# Patient Record
Sex: FEMALE | URBAN - METROPOLITAN AREA
[De-identification: names, ages, dates, MRNs, and addresses within clinical notes are randomized per-mention and may not be internally consistent; named-entity substitution may affect disease eponyms.]

---

## 2023-02-13 ENCOUNTER — OFFICE VISIT (OUTPATIENT)
Dept: NEUROSURGERY | Age: 73
End: 2023-02-13
Payer: MEDICARE

## 2023-02-13 VITALS
HEIGHT: 62 IN | DIASTOLIC BLOOD PRESSURE: 90 MMHG | TEMPERATURE: 97.1 F | HEART RATE: 79 BPM | SYSTOLIC BLOOD PRESSURE: 132 MMHG | BODY MASS INDEX: 29.74 KG/M2 | OXYGEN SATURATION: 98 % | WEIGHT: 161.6 LBS

## 2023-02-13 DIAGNOSIS — I67.1 BRAIN ANEURYSM: Primary | ICD-10-CM

## 2023-02-13 PROCEDURE — 99204 OFFICE O/P NEW MOD 45 MIN: CPT | Performed by: RADIOLOGY

## 2023-02-13 PROCEDURE — 1123F ACP DISCUSS/DSCN MKR DOCD: CPT | Performed by: RADIOLOGY

## 2023-02-13 PROCEDURE — G8417 CALC BMI ABV UP PARAM F/U: HCPCS | Performed by: RADIOLOGY

## 2023-02-13 PROCEDURE — G8432 DEP SCR NOT DOC, RNG: HCPCS | Performed by: RADIOLOGY

## 2023-02-13 PROCEDURE — G8536 NO DOC ELDER MAL SCRN: HCPCS | Performed by: RADIOLOGY

## 2023-02-13 PROCEDURE — 1090F PRES/ABSN URINE INCON ASSESS: CPT | Performed by: RADIOLOGY

## 2023-02-13 PROCEDURE — 1101F PT FALLS ASSESS-DOCD LE1/YR: CPT | Performed by: RADIOLOGY

## 2023-02-13 PROCEDURE — 3017F COLORECTAL CA SCREEN DOC REV: CPT | Performed by: RADIOLOGY

## 2023-02-13 PROCEDURE — G8427 DOCREV CUR MEDS BY ELIG CLIN: HCPCS | Performed by: RADIOLOGY

## 2023-02-13 PROCEDURE — G8400 PT W/DXA NO RESULTS DOC: HCPCS | Performed by: RADIOLOGY

## 2023-02-13 RX ORDER — ROSUVASTATIN CALCIUM 20 MG/1
20 TABLET, COATED ORAL
COMMUNITY

## 2023-02-13 RX ORDER — CETIRIZINE HYDROCHLORIDE 10 MG/1
10 TABLET ORAL DAILY
COMMUNITY

## 2023-02-13 RX ORDER — MULTIVITAMIN
1 TABLET ORAL DAILY
COMMUNITY

## 2023-02-13 NOTE — PROGRESS NOTES
New patient referred by Dr Smita Alvarez presenting with Cerebral aneurysm. Spouse at visit with patient. Patient reports cerebral aneurysm since 2012 when diagnosed with Bell's palsy.   Denies headaches

## 2023-02-17 DIAGNOSIS — I67.1 CEREBRAL ANEURYSM: Primary | ICD-10-CM

## 2023-02-21 NOTE — PROGRESS NOTES
Neurointerventional Surgery Clinic Note        Patient: Chelsey Quintero MRN: [id number]  SSN: [id number]    YOB: 1950  Age: 67 y.o. Sex: female      Subjective:      Chelsey Quintero is a 67 y.o. female who is being seen for a right posterior communicating artery aneurysm, diagnosed previously at an outside institution. Patient presents to the clinic for consultation regarding further management. Past Medical History:   Diagnosis Date    Bell's palsy     History of cataract surgery     Osteopenia 2020     Past Surgical History:   Procedure Laterality Date    HX  SECTION      , 0      Family History   Problem Relation Age of Onset    Stroke Father     Cancer Father     Cancer Brother     Heart Disease Other      Social History     Tobacco Use    Smoking status: Unknown    Smokeless tobacco: Not on file   Substance Use Topics    Alcohol use: Not on file      Current Outpatient Medications   Medication Sig Dispense Refill    omega-3s/dha/epa/fish oil (OMEGA 3 PO) Take 1,200 mg by mouth daily. multivitamin (ONE A DAY) tablet Take 1 Tablet by mouth daily. cholecalciferol, vitamin D3, (VITAMIN D3 PO) Take 2,000 Int'l Units/day by mouth daily. calcium carbonate (CALCIUM 600 PO) Take 600 mg by mouth daily. cetirizine (Aller-Natasha) 10 mg tablet Take 10 mg by mouth daily. rosuvastatin (CRESTOR) 20 mg tablet Take 20 mg by mouth nightly. Not on File    Review of Systems:  Pertinent items are noted in the History of Present Illness.     Objective:     Vitals:    23 1059   BP: (!) 132/90   Pulse: 79   Temp: 97.1 °F (36.2 °C)   TempSrc: Temporal   SpO2: 98%   Weight: 161 lb 9.6 oz (73.3 kg)   Height: 5' 2\" (1.575 m)        Physical Exam:  GENERAL: alert, cooperative, no distress, appears stated age  LUNG: clear to auscultation bilaterally  HEART: regular rate and rhythm  EXTREMITIES:  extremities normal, atraumatic, no cyanosis or edema    Neurologic Exam:  Mental Status:  Alert and oriented x 4. Appropriate affect, mood and behavior. Language:    Normal fluency, repetition, comprehension and naming. Cranial Nerves:   EOMI, PERRLA      Facial sensation intact V1 - V3. Full facial strength, no asymmetry. Hearing intact bilaterally. No dysarthria. Tongue protrudes to midline  symmetrically. Shoulder shrug 5/5 bilaterally. Motor:    No pronator drift. Bulk and tone normal.      5/5 power in all extremities proximally and distally. No involuntary movements. Sensation:    Sensation intact throughout to light touch    Reflexes:    Deferred    Coordination & Gait: Normal      My interpretation of Imaging:       I have reviewed patient's prior CTAs including the most recent CTA from 01/2023. The CT angiograms demonstrate a broad-based saccular aneurysm at the right posterior communicating artery origin, measuring approximately 6-7 mm. When compared to prior CTAs, this aneurysm does not appear to have significantly changed in size and configuration in the interim. No additional aneurysm, AV malformation or hemodynamically significant stenosis noted. Assessment:     I recommend a catheter cerebral arteriogram for further assessment of the aneurysm to better assess the size and configuration of the aneurysm and its relationship with the posterior communicating artery. Further discussion regarding conservative follow-up versus aneurysm treatment will be held after the angiogram has been performed. In addition, if conservative management is chosen by the patient, the study will establish a good baseline for future comparison. All of patient's and her 's questions answered to their satisfaction. They understand and agree to the plan.       Plan:     Catheter cerebral arteriogram.    In accordance with the patient's symptoms and imaging findings, i recommended that we perform a diagnostic cervico-cerebral angiogram to further evaluate and characterize for intracranial and extracranial vascular pathology. I have discussed the risks, benefits and alternatives of the procedure with the patient in detail. The risks discussed included but were not limited to bleeding, infection, blood vessel damage/dissection, stroke with transient or permanent weakness, numbness or other deficit, speech and language dysfunction, vision loss, brain damage and death. Potential need for more surgery or interventions was also discussed. Risk of renal failure from contrast was also discussed. Patient understood, agreed and signed the consent form. The patient questions were answered thoroughly. The patient expressed understanding and elected to proceed with endovascular diagnostic cervico-cerebral angiogram. The patient consented for the procedure. Thank you for allowing me to participate in the care of this patient. Greater than 45 minutes were spent in patient management, greater than half of which was spent in counseling and coordination of care.         Signed By: Poonam Zavaleta MD, MBA  Neuro-endovascular Surgery  Director - [facility] Stroke [facility], [facility]  Associate Professor of Radiology, [facility]      February 21, 2023

## 2023-02-23 ENCOUNTER — TELEPHONE (OUTPATIENT)
Dept: NEUROSURGERY | Age: 73
End: 2023-02-23

## 2023-02-23 NOTE — TELEPHONE ENCOUNTER
Patient called to let Pablo Karimi and Dr. Mayra Meade know that she would like to proceed with Dr. Tanner Body recommendations.

## 2023-03-02 ENCOUNTER — TELEPHONE (OUTPATIENT)
Dept: NEUROSURGERY | Age: 73
End: 2023-03-02

## 2023-03-02 DIAGNOSIS — I67.1 BRAIN ANEURYSM: Primary | ICD-10-CM

## 2023-03-02 NOTE — TELEPHONE ENCOUNTER
Spoke to patient to confirm Diagnostic Angiogram on 3/23/2023 at [address] time 7:00 am at Out Patient Registration. Blood work will be needed at least one week prior to procedure at a [facility].  No eating or drinking after midnight the night prior to Angiogram and take all morning medications with sips of water the morning of the angiogram.  You must have a  to drive you home upon discharge. Recommend you have someone with you for at least 24-48 hours post procedure. No metal or glue in hair. Patient stated understanding.

## 2023-03-13 ENCOUNTER — TELEPHONE (OUTPATIENT)
Dept: NEUROSURGERY | Age: 73
End: 2023-03-13

## 2023-03-13 NOTE — TELEPHONE ENCOUNTER
Patient called to speak to Eric Guerrier because she has a few questions about her procedure on 3/23/23.

## 2023-03-14 NOTE — TELEPHONE ENCOUNTER
Spoke to patient to explain that she will only have a Diagnostic angiogram on 3/23/2023. The provider will not treat condition at that time. Informed patient if she needs treatment, it will be 3-4 weeks after Diagnostic angiogram.   Explained about insurance authorization and PAT. Patient stated understanding.

## 2023-03-22 ENCOUNTER — TELEPHONE (OUTPATIENT)
Dept: NEUROSURGERY | Age: 73
End: 2023-03-22

## 2023-03-22 NOTE — TELEPHONE ENCOUNTER
Spoke to patient to confirm Diagnostic Angiogram tomorrow at [address] time 7:00 am at Out Patient Registration. Reminded patient:  No eating or drinking after midnight the night prior to Angiogram and take all morning medications with sips of water the morning of the angiogram.  You must have a  to drive you home upon discharge. Recommend you have someone with you for at least 24-48 hours post procedure. No metal or glue in hair. All patients questions answered. Patient stated understanding.

## 2023-03-23 ENCOUNTER — HOSPITAL ENCOUNTER (OUTPATIENT)
Dept: INTERVENTIONAL RADIOLOGY/VASCULAR | Age: 73
Discharge: HOME | End: 2023-03-23
Attending: RADIOLOGY | Admitting: RADIOLOGY
Payer: MEDICARE

## 2023-03-23 VITALS
DIASTOLIC BLOOD PRESSURE: 65 MMHG | OXYGEN SATURATION: 97 % | SYSTOLIC BLOOD PRESSURE: 113 MMHG | HEART RATE: 54 BPM | WEIGHT: 163 LBS | BODY MASS INDEX: 30 KG/M2 | TEMPERATURE: 98.6 F | HEIGHT: 62 IN | RESPIRATION RATE: 18 BRPM

## 2023-03-23 DIAGNOSIS — I67.1 BRAIN ANEURYSM: ICD-10-CM

## 2023-03-23 PROCEDURE — 74011250636 HC RX REV CODE- 250/636: Performed by: RADIOLOGY

## 2023-03-23 PROCEDURE — 77030008814 HC VLV ACC PLUS MRTM -B

## 2023-03-23 PROCEDURE — 77030008584 HC TOOL GDWRE DEV TERU -A

## 2023-03-23 PROCEDURE — 74011250637 HC RX REV CODE- 250/637: Performed by: RADIOLOGY

## 2023-03-23 PROCEDURE — C1887 CATHETER, GUIDING: HCPCS

## 2023-03-23 PROCEDURE — 77030019569 HC BND COMPR RAD TERU -B

## 2023-03-23 PROCEDURE — 2709999900 HC NON-CHARGEABLE SUPPLY

## 2023-03-23 PROCEDURE — 74011000250 HC RX REV CODE- 250: Performed by: RADIOLOGY

## 2023-03-23 PROCEDURE — C1894 INTRO/SHEATH, NON-LASER: HCPCS

## 2023-03-23 PROCEDURE — C1769 GUIDE WIRE: HCPCS

## 2023-03-23 PROCEDURE — 36224 PLACE CATH CAROTD ART: CPT

## 2023-03-23 PROCEDURE — 74011000636 HC RX REV CODE- 636: Performed by: RADIOLOGY

## 2023-03-23 PROCEDURE — 77030003394 HC NDL ART COOK -A

## 2023-03-23 RX ORDER — HEPARIN SODIUM 1000 [USP'U]/ML
2000 INJECTION, SOLUTION INTRAVENOUS; SUBCUTANEOUS
Status: COMPLETED | OUTPATIENT
Start: 2023-03-23 | End: 2023-03-23

## 2023-03-23 RX ORDER — VERAPAMIL HYDROCHLORIDE 2.5 MG/ML
2.5 INJECTION, SOLUTION INTRAVENOUS
Status: COMPLETED | OUTPATIENT
Start: 2023-03-23 | End: 2023-03-23

## 2023-03-23 RX ORDER — FENTANYL CITRATE 50 UG/ML
25-100 INJECTION, SOLUTION INTRAMUSCULAR; INTRAVENOUS
Status: DISCONTINUED | OUTPATIENT
Start: 2023-03-23 | End: 2023-03-23

## 2023-03-23 RX ORDER — MIDAZOLAM HYDROCHLORIDE 1 MG/ML
.5-2 INJECTION, SOLUTION INTRAMUSCULAR; INTRAVENOUS
Status: DISCONTINUED | OUTPATIENT
Start: 2023-03-23 | End: 2023-03-23

## 2023-03-23 RX ORDER — SODIUM BICARBONATE 42 MG/ML
1 INJECTION, SOLUTION INTRAVENOUS
Status: COMPLETED | OUTPATIENT
Start: 2023-03-23 | End: 2023-03-23

## 2023-03-23 RX ORDER — LIDOCAINE HYDROCHLORIDE 10 MG/ML
10 INJECTION INFILTRATION; PERINEURAL
Status: COMPLETED | OUTPATIENT
Start: 2023-03-23 | End: 2023-03-23

## 2023-03-23 RX ORDER — LIDOCAINE 40 MG/G
CREAM TOPICAL
Status: COMPLETED | OUTPATIENT
Start: 2023-03-23 | End: 2023-03-23

## 2023-03-23 RX ORDER — SODIUM CHLORIDE 9 MG/ML
50 INJECTION, SOLUTION INTRAVENOUS CONTINUOUS
Status: DISCONTINUED | OUTPATIENT
Start: 2023-03-23 | End: 2023-03-23

## 2023-03-23 RX ADMIN — SODIUM BICARBONATE 42 MG: 42 INJECTION, SOLUTION INTRAVENOUS at 08:24

## 2023-03-23 RX ADMIN — Medication 4000 UNITS: at 08:22

## 2023-03-23 RX ADMIN — VERAPAMIL HYDROCHLORIDE 2.5 MG: 2.5 INJECTION INTRAVENOUS at 08:24

## 2023-03-23 RX ADMIN — Medication 4000 UNITS: at 08:21

## 2023-03-23 RX ADMIN — LIDOCAINE 4%: 4 CREAM TOPICAL at 07:51

## 2023-03-23 RX ADMIN — Medication 4000 UNITS: at 08:24

## 2023-03-23 RX ADMIN — NITROGLYCERIN 100 MCG: 10 INJECTION INTRAVENOUS at 08:24

## 2023-03-23 RX ADMIN — SODIUM CHLORIDE 50 ML/HR: 9 INJECTION, SOLUTION INTRAVENOUS at 08:07

## 2023-03-23 RX ADMIN — LIDOCAINE HYDROCHLORIDE 1 ML: 10 INJECTION, SOLUTION INFILTRATION; PERINEURAL at 08:19

## 2023-03-23 RX ADMIN — HEPARIN SODIUM 2000 UNITS: 1000 INJECTION INTRAVENOUS; SUBCUTANEOUS at 08:24

## 2023-03-23 RX ADMIN — Medication 4000 UNITS: at 08:19

## 2023-03-23 RX ADMIN — FENTANYL CITRATE 50 MCG: 50 INJECTION, SOLUTION INTRAMUSCULAR; INTRAVENOUS at 08:13

## 2023-03-23 RX ADMIN — NITROGLYCERIN 1 INCH: 20 OINTMENT TOPICAL at 07:51

## 2023-03-23 RX ADMIN — IOPAMIDOL 129 ML: 612 INJECTION, SOLUTION INTRAVENOUS at 09:10

## 2023-03-23 RX ADMIN — MIDAZOLAM 1 MG: 1 INJECTION INTRAMUSCULAR; INTRAVENOUS at 08:13

## 2023-03-23 NOTE — DISCHARGE INSTRUCTIONS
You have received sedation medications today. YOU SHOULD NOT DRIVE FOR 24 HOURS, DO NOT OPERATE HEAVY MACHINERY, DO NOT MAKE ANY LEGAL DECISIONS OR SIGN LEGAL DOCUMENTS FOR 24 HOURS. DO NOT DRINK ALCOHOL, TAKE ANY MEDICATIONS UNLESS PRESCRIBED BY YOUR DOCTOR. IF YOU ARE A CAREGIVER, SOMEONE SHOULD TAKE THAT ROLE FOR 24 HOURS. Side effects of sedation medications and other medications used today have been reviewed  Those side effects can include but are not limited to: dizziness, drowsiness, poor balance, fatigue, sleepiness. Take precautions at home to prevent falls, such as assistance with walking or stairs if allowed and /or when needed or position changes. Allergic or adverse reactions could include nausea, itching, hives, dizziness, or anything else out of the ordinary. Should you experience any of these significant changes, please call [phone] between the hours of 7:30 am and 3:30 pm or [phone] after hours. After hours, ask the  to page the X-ray Technologist, and describe the problem to the technologist. If you are experiencing chest pain, shortness of breath, altered mental state, unusual bleeding or any other emergent symptom you should call 911 immediately. Brain Angiogram: What to Expect at [address] brain angiogram (cerebral angiogram) is a test (also called a procedure) that looks for problems with blood vessels and blood flow in the brain. The doctor inserted a thin, flexible tube (catheter) into a blood vessel in your groin. Or the doctor may have put the catheter in a blood vessel in your arm. Then a dye was inserted into the catheter. The dye flowed into the blood vessel. The dye made the blood vessels show up on a video screen. You may have had this test to see if a blood vessel in the brain is bulging, narrowed, or blocked.  The test may also be used to check other symptoms, such as unusual headaches, or to check problems found during a different test.  You may have a bruise where the catheter was put in, and you may feel sore for a few days. You can do light activities around the house. But don't do anything strenuous for several days. This care sheet gives you a general idea about how long it will take for you to recover. But each person recovers at a different pace. Follow the steps below to feel better as quickly as possible. How can you care for yourself at home? Activity    Do not do strenuous exercise and do not lift, pull, or push anything heavy until your doctor says it is okay. This may be for several days. You can walk around the house and do light activity, such as cooking. If the catheter was placed in your groin, try not to walk up stairs for the first couple of days. If the catheter was placed in your arm near your wrist, do not bend your wrist deeply for the first couple of days. Be careful using your hand to get into and out of a chair or bed. If your doctor recommends it, get more exercise. Walking is a good choice. Bit by bit, increase the amount you walk every day. Try for at least 30 minutes on most days of the week. Diet    Drink plenty of fluids to help your body flush out the dye. If you have kidney, heart, or liver disease and have to limit fluids, talk with your doctor before you increase the amount of fluids you drink. Keep eating a heart-healthy diet that has lots of fruits, vegetables, and whole grains. If you need help with your diet, talk to your doctor. You also may want to talk to a dietitian. This expert can help you to learn about healthy foods and plan meals. Medicines    You can restart your medicines. If you stopped taking aspirin or some other blood thinner, your doctor will tell you when to start taking it again. Be safe with medicines. Read and follow all instructions on the label. If the doctor gave you a prescription medicine for pain, take it as prescribed.   If you are not taking a prescription pain medicine, ask your doctor if you can take an over-the-counter medicine. Care of the catheter site    For the first 3 days, keep a bandage over the spot where the catheter was put in. Put ice or a cold pack on the area for 10 to 20 minutes at a time. Try to do this every 1 to 2 hours for the next 3 days (when you are awake) or until the swelling goes down. Put a thin cloth between the ice and your skin. You may shower 24 hours after the procedure, if your doctor okays it. Pat the incision dry. Do not soak the catheter site until it is healed. Don't take a bath for 1 week, or until your doctor tells you it is okay. Watch for bleeding from the site. A small amount of blood (up to the size of a quarter) on the bandage can be normal.     If you are bleeding, lie down and press on the area for 15 minutes to try to make it stop. If the bleeding does not stop, call your doctor or seek immediate medical care. Follow-up care is a key part of your treatment and safety. Be sure to make and go to all appointments, and call your doctor if you are having problems. It's also a good idea to know your test results and keep a list of the medicines you take. When should you call for help? Call 911 anytime you think you may need emergency care. For example, call if:    You passed out (lost consciousness). You have severe trouble breathing. You have sudden chest pain and shortness of breath, or you cough up blood. You have symptoms of a stroke. These may include:  Sudden numbness, tingling, weakness, or loss of movement in your face, arm, or leg, especially on only one side of your body. Sudden vision changes. Sudden trouble speaking. Sudden confusion or trouble understanding simple statements. Sudden problems with walking or balance. A sudden, severe headache that is different from past headaches.    Call your doctor now or seek immediate medical care if:    You are bleeding from the area where the catheter was put in your artery. You have a fast-growing, painful lump at the catheter site. You have symptoms of infection, such as: Increased pain, swelling, warmth, or redness. Red streaks leading from the area. Pus draining from the area. A fever. Your leg, arm, or hand is painful, looks blue, or feels cold, numb, or tingly. Watch closely for any changes in your health, and be sure to contact your doctor if:    You are not getting better as expected. Where can you learn more? Go to http://www.gray.com/  Enter O249 in the search box to learn more about \"Brain Angiogram: What to Expect at Home. \"  Current as of: March 28, 2022               Content Version: 13.4  © 2006-2022 [employer]. Care instructions adapted under license by [employer] (which disclaims liability or warranty for this information). If you have questions about a medical condition or this instruction, always ask your healthcare professional. [place] [address] any warranty or liability for your use of this information.

## 2023-03-23 NOTE — PROGRESS NOTES
Name of procedure:  diagnostic cerebral angiogram     Sedation medications given: versed 1mg, fentanyl 50 mcg     Sedation tolerated: well     Total Procedure time: 50 min     Vital Signs: stable     Any complications related to procedure: see orders     Post Procedure Care Needed/order sets placed in connect care: see orders     Pt tolerated procedure well. VSS. No C/O pain. Dressing to site D&I. No bleeding or hematoma noted to site. IV D/Cd. Discharge instructions given. Copy on chart and copy given to pt. Pt verbalized understanding. Pt taken to car by wheelchair and taken home by family. NAD noted at time of discharge.

## 2023-03-23 NOTE — BRIEF OP NOTE
Neuro-Interventional Surgery - Brief procedure note      Patient: Nga Robb MRN: [id number]     YOB: 1950  Age: 67 y.o. Sex: female      Service: Neuro-Interventional Surgery    Date of Procedure: 3/23/2023    Pre-Procedure Diagnosis: Intracranial aneurysm    Post-Procedure Diagnosis: SAME    Procedure(s): Catheter cerebral/cervical arteriogram    Vessels selected: Right vertebral artery, Right common carotid artery, Right internal carotid artery, Left common carotid artery, and Left subclavian artery    Brief Description of Procedure: Broad based R Pcomm aneurysm identified. No additional aneurysm noted. Right common radial arterial puncture site hemostasis achieved by deploying TR band without complications. No hematoma or oozing noted. Sterile dressing applied over the puncture site. Anesthesia:Moderate Sedation    Estimated Blood Loss:  10 ml. Specimens:  None    Implants:  None    Apparent Intraoperative Complications:  None immediate    Patient Condition:  Stable    Disposition:  Same day recovery unit    Attestation:  I performed the procedure.         Signed By: Sandra Chaparro MD     March 23, 2023     [address] [facility]

## 2023-03-23 NOTE — ROUTINE PROCESS
Pt arrives ambulatory to angio department accompanied by family for cerebral angiogram procedure. All assessments completed and consent was reviewed. Education given was regarding procedure, moderate sedation, post-procedure care and  management/follow-up. Opportunity for questions was provided and all questions and concerns were addressed.

## 2023-03-23 NOTE — H&P
NeuroInterventional Surgery H&P  Jessi Blackman NP    Patient: Nga Robb MRN: [id number]  SSN: [id number]    YOB: 1950  Age: 67 y.o. Sex: female      Subjective:      Nga Robb is a 67 y.o. F with a pmh of bell's palsy and cataract repair who was referred to the [facility] by Dr Fermín Trevizo. She presents today for a diagnostic cerebral angiogram to evaluate a right posterior communicating artery aneurysm. She feels well overall today and is ready to proceed. Past Medical History:   Diagnosis Date    Bell's palsy     History of cataract surgery     Osteopenia 09/28/2020     Family History   Problem Relation Age of Onset    Stroke Father     Cancer Father     Cancer Brother     Heart Disease Other      Social History     Tobacco Use    Smoking status: Unknown    Smokeless tobacco: Not on file   Substance Use Topics    Alcohol use: Not on file      Prior to Admission Medications   Prescriptions Last Dose Informant Patient Reported? Taking?   calcium carbonate (CALCIUM 600 PO) 3/23/2023  Yes Yes   Sig: Take 600 mg by mouth daily. cetirizine (ZYRTEC) 10 mg tablet 3/23/2023  Yes Yes   Sig: Take 10 mg by mouth daily. cholecalciferol, vitamin D3, (VITAMIN D3 PO) 3/23/2023  Yes Yes   Sig: Take 2,000 Int'l Units/day by mouth daily. multivitamin (ONE A DAY) tablet 3/22/2023  Yes Yes   Sig: Take 1 Tablet by mouth daily. omega-3s/dha/epa/fish oil (OMEGA 3 PO) 3/23/2023  Yes Yes   Sig: Take 1,200 mg by mouth daily. rosuvastatin (CRESTOR) 20 mg tablet 3/23/2023  Yes Yes   Sig: Take 20 mg by mouth nightly. Facility-Administered Medications: None       No Known Allergies    Review of Systems:  A comprehensive review of systems was negative. Denies numbness, tingling, nausea, vomiting, difficulty swallowing or speaking, headache, blurred vision or dizziness.      Objective:     Vitals:    03/23/23 0716   BP: 124/77   Pulse: (!) 59   Resp: 13   Temp: 98.6 °F (37 °C)   SpO2: 96%   Weight: 163 lb (73.9 kg)   Height: 5' 2\" (1.575 m)      Physical Exam:  GENERAL: Calm, cooperative, NAD  SKIN: Warm, dry, color appropriate for ethnicity. Neurologic Exam:  Mental Status:  Alert and oriented x 4. Appropriate affect, mood and behavior. Language:    Normal fluency, repetition, comprehension and naming. Cranial Nerves:   Pupils 3 mm, equal, round and reactive to light. Visual fields full to confrontation. Extraocular movements intact. Facial sensation intact. Full facial strength, no asymmetry. Hearing grossly intact bilaterally. No dysarthria. Tongue protrudes to midline, palate elevates symmetrically. Shoulder shrug 5/5 bilaterally. Motor:    No pronator drift. Bulk and tone normal.      5/5 power in all extremities proximally and distally. No involuntary movements. Sensation:    Sensation intact throughout to light touch. Coordination & Gait: Normal. FTN and HTS intact with no ataxia present. Labs:  Lab Results   Component Value Date/Time    WBC 4.2 03/10/2023 08:59 AM    HGB 13.0 03/10/2023 08:59 AM    HCT 39.3 03/10/2023 08:59 AM    PLATELET 298 15/10/9884 08:59 AM    MCV 94 03/10/2023 08:59 AM      Lab Results   Component Value Date/Time    Sodium 143 03/10/2023 08:59 AM    Potassium 4.8 03/10/2023 08:59 AM    Chloride 106 03/10/2023 08:59 AM    CO2 26 03/10/2023 08:59 AM    Glucose 101 (H) 03/10/2023 08:59 AM    BUN 14 03/10/2023 08:59 AM    Creatinine 0.84 03/10/2023 08:59 AM    BUN/Creatinine ratio 17 03/10/2023 08:59 AM    Calcium 9.6 03/10/2023 08:59 AM     Imaging:  CTA from 01/2023. The CT angiograms demonstrate a broad-based saccular aneurysm at the right posterior communicating artery origin, measuring approximately 6-7 mm. When compared to prior CTAs, this aneurysm does not appear to have significantly changed in size and configuration in the interim.     Assessment:   Cerebral aneurysm    Plan:      Proceed with diagnostic cerebral angiogram today with Dr. Bryce Fink, benefits and alternatives of procedure were reviewed prior to this procedure by myself and Dr. Jacy Fay with patient. The patient verbalized understanding and would like to proceed with procedure as planned. All questions were answered, and written informed consent has been obtained.     Signed By: Go Capellan NP     March 23, 2023

## 2023-03-27 ENCOUNTER — OFFICE VISIT (OUTPATIENT)
Dept: NEUROSURGERY | Age: 73
End: 2023-03-27
Payer: MEDICARE

## 2023-03-27 VITALS
HEIGHT: 62 IN | WEIGHT: 161.2 LBS | HEART RATE: 76 BPM | OXYGEN SATURATION: 97 % | TEMPERATURE: 96.9 F | SYSTOLIC BLOOD PRESSURE: 120 MMHG | BODY MASS INDEX: 29.66 KG/M2 | DIASTOLIC BLOOD PRESSURE: 80 MMHG

## 2023-03-27 DIAGNOSIS — I67.1 BRAIN ANEURYSM: Primary | ICD-10-CM

## 2023-03-27 PROCEDURE — G8432 DEP SCR NOT DOC, RNG: HCPCS | Performed by: RADIOLOGY

## 2023-03-27 PROCEDURE — 1123F ACP DISCUSS/DSCN MKR DOCD: CPT | Performed by: RADIOLOGY

## 2023-03-27 PROCEDURE — G8417 CALC BMI ABV UP PARAM F/U: HCPCS | Performed by: RADIOLOGY

## 2023-03-27 PROCEDURE — 99213 OFFICE O/P EST LOW 20 MIN: CPT | Performed by: RADIOLOGY

## 2023-03-27 PROCEDURE — G8536 NO DOC ELDER MAL SCRN: HCPCS | Performed by: RADIOLOGY

## 2023-03-27 PROCEDURE — 3017F COLORECTAL CA SCREEN DOC REV: CPT | Performed by: RADIOLOGY

## 2023-03-27 PROCEDURE — G8427 DOCREV CUR MEDS BY ELIG CLIN: HCPCS | Performed by: RADIOLOGY

## 2023-03-27 PROCEDURE — 1101F PT FALLS ASSESS-DOCD LE1/YR: CPT | Performed by: RADIOLOGY

## 2023-03-27 PROCEDURE — G8400 PT W/DXA NO RESULTS DOC: HCPCS | Performed by: RADIOLOGY

## 2023-03-27 PROCEDURE — 1090F PRES/ABSN URINE INCON ASSESS: CPT | Performed by: RADIOLOGY

## 2023-03-28 ENCOUNTER — TELEPHONE (OUTPATIENT)
Dept: NEUROSURGERY | Age: 73
End: 2023-03-28

## 2023-03-28 NOTE — TELEPHONE ENCOUNTER
Patient called to let us know that she has decided to move forward with Dr. Torrie Curling proposed treatment. Please call her to answer a few questions and discuss dates.

## 2023-03-30 NOTE — TELEPHONE ENCOUNTER
Spoke to patient to tentatively schedule her procedure for 5/16/2023 at [facility]. Informed patient   She will be contacted by [facility] to schedule an appointment for labs, chest xray, ekg. No eating or drinking after midnight the day prior to procedure. Take morning medications the morning of procedure with sips of water. Do not stop taking Blood Thinners if applicable unless notified by this office. You must have a  to drive you home upon discharge. Recommend you have someone with you for at least 24-48 hours post procedure. No metal of glue in hair. Will start Plavix and Aspirin on 4/25/2023. Patient stated understanding.

## 2023-04-22 DIAGNOSIS — I67.1 BRAIN ANEURYSM: Primary | ICD-10-CM

## 2023-04-22 DIAGNOSIS — I67.1 CEREBRAL ANEURYSM: Primary | ICD-10-CM

## 2023-05-04 ENCOUNTER — TELEPHONE (OUTPATIENT)
Dept: NEUROSURGERY | Age: 73
End: 2023-05-04

## 2023-05-04 DIAGNOSIS — I67.1 BRAIN ANEURYSM: Primary | ICD-10-CM

## 2023-05-04 RX ORDER — CLOPIDOGREL BISULFATE 75 MG/1
75 TABLET ORAL DAILY
Qty: 30 TABLET | Refills: 5 | Status: SHIPPED | OUTPATIENT
Start: 2023-05-04

## 2023-05-04 RX ORDER — ASPIRIN 81 MG/1
81 TABLET ORAL DAILY
Qty: 30 TABLET | Refills: 5 | Status: SHIPPED | OUTPATIENT
Start: 2023-05-04

## 2023-05-09 DIAGNOSIS — I67.1 BRAIN ANEURYSM: Primary | ICD-10-CM

## 2023-05-10 ENCOUNTER — HOSPITAL ENCOUNTER (OUTPATIENT)
Facility: HOSPITAL | Age: 73
Discharge: HOME | End: 2023-05-13
Payer: MEDICAID

## 2023-05-10 VITALS
BODY MASS INDEX: 29.59 KG/M2 | HEIGHT: 62 IN | WEIGHT: 160.8 LBS | HEART RATE: 60 BPM | SYSTOLIC BLOOD PRESSURE: 119 MMHG | TEMPERATURE: 97.8 F | DIASTOLIC BLOOD PRESSURE: 79 MMHG

## 2023-05-10 LAB
ALBUMIN SERPL-MCNC: 3.6 G/DL (ref 3.5–5)
ALBUMIN/GLOB SERPL: 1.1 (ref 1.1–2.2)
ALP SERPL-CCNC: 68 U/L (ref 45–117)
ALT SERPL-CCNC: 64 U/L (ref 12–78)
ANION GAP SERPL CALC-SCNC: 2 MMOL/L (ref 5–15)
ASPIRIN: 389 ARU
AST SERPL-CCNC: 28 U/L (ref 15–37)
BASOPHILS # BLD: 0 K/UL (ref 0–0.1)
BASOPHILS NFR BLD: 0 % (ref 0–1)
BILIRUB SERPL-MCNC: 0.8 MG/DL (ref 0.2–1)
BUN SERPL-MCNC: 15 MG/DL (ref 6–20)
BUN/CREAT SERPL: 18 (ref 12–20)
CALCIUM SERPL-MCNC: 9.8 MG/DL (ref 8.5–10.1)
CHLORIDE SERPL-SCNC: 110 MMOL/L (ref 97–108)
CO2 SERPL-SCNC: 29 MMOL/L (ref 21–32)
CREAT SERPL-MCNC: 0.83 MG/DL (ref 0.55–1.02)
DIFFERENTIAL METHOD BLD: ABNORMAL
EOSINOPHIL # BLD: 0.3 K/UL (ref 0–0.4)
EOSINOPHIL NFR BLD: 10 % (ref 0–7)
ERYTHROCYTE [DISTWIDTH] IN BLOOD BY AUTOMATED COUNT: 12.7 % (ref 11.5–14.5)
GLOBULIN SER CALC-MCNC: 3.2 G/DL (ref 2–4)
GLUCOSE SERPL-MCNC: 104 MG/DL (ref 65–100)
HCT VFR BLD AUTO: 40.7 % (ref 35–47)
HGB BLD-MCNC: 12.7 G/DL (ref 11.5–16)
IMM GRANULOCYTES # BLD AUTO: 0 K/UL (ref 0–0.04)
IMM GRANULOCYTES NFR BLD AUTO: 0 % (ref 0–0.5)
LYMPHOCYTES # BLD: 1.1 K/UL (ref 0.8–3.5)
LYMPHOCYTES NFR BLD: 35 % (ref 12–49)
MCH RBC QN AUTO: 30.2 PG (ref 26–34)
MCHC RBC AUTO-ENTMCNC: 31.2 G/DL (ref 30–36.5)
MCV RBC AUTO: 96.9 FL (ref 80–99)
MONOCYTES # BLD: 0.4 K/UL (ref 0–1)
MONOCYTES NFR BLD: 12 % (ref 5–13)
NEUTS SEG # BLD: 1.4 K/UL (ref 1.8–8)
NEUTS SEG NFR BLD: 43 % (ref 32–75)
NRBC # BLD: 0 K/UL (ref 0–0.01)
NRBC BLD-RTO: 0 PER 100 WBC
P2Y12 PLT RESPONSE: 190 PRU (ref 194–418)
PLATELET # BLD AUTO: 204 K/UL (ref 150–400)
PMV BLD AUTO: 9.6 FL (ref 8.9–12.9)
POTASSIUM SERPL-SCNC: 4 MMOL/L (ref 3.5–5.1)
PROT SERPL-MCNC: 6.8 G/DL (ref 6.4–8.2)
RBC # BLD AUTO: 4.2 M/UL (ref 3.8–5.2)
SODIUM SERPL-SCNC: 141 MMOL/L (ref 136–145)
WBC # BLD AUTO: 3.2 K/UL (ref 3.6–11)

## 2023-05-10 PROCEDURE — 85576 BLOOD PLATELET AGGREGATION: CPT

## 2023-05-10 PROCEDURE — 86900 BLOOD TYPING SEROLOGIC ABO: CPT

## 2023-05-10 PROCEDURE — 85025 COMPLETE CBC W/AUTO DIFF WBC: CPT

## 2023-05-10 PROCEDURE — 86901 BLOOD TYPING SEROLOGIC RH(D): CPT

## 2023-05-10 PROCEDURE — 86850 RBC ANTIBODY SCREEN: CPT

## 2023-05-10 PROCEDURE — 80053 COMPREHEN METABOLIC PANEL: CPT

## 2023-05-10 PROCEDURE — 36415 COLL VENOUS BLD VENIPUNCTURE: CPT

## 2023-05-10 PROCEDURE — 93005 ELECTROCARDIOGRAM TRACING: CPT | Performed by: NURSE PRACTITIONER

## 2023-05-10 RX ORDER — CALCIUM CARBONATE/VITAMIN D3 600 MG-10
1 TABLET ORAL DAILY
COMMUNITY

## 2023-05-10 RX ORDER — ACETAMINOPHEN 160 MG
2 TABLET,DISINTEGRATING ORAL DAILY
COMMUNITY

## 2023-05-10 RX ORDER — CLOPIDOGREL BISULFATE 75 MG/1
75 TABLET ORAL DAILY
COMMUNITY

## 2023-05-10 RX ORDER — IBUPROFEN 200 MG
1 CAPSULE ORAL DAILY
COMMUNITY

## 2023-05-10 NOTE — PERIOP NOTE
CALLED TO CARDIOLOGY/DR. BRANES/[phone] AND REQ MOST RECENT NOTES AND ANY EKG IF AVAILABLE. NOTES FROM 06/2022 REC'D AND AN EKG FROM 2017. ALL PLACED ON CHART.
etc.) please notify your surgeon as soon as possible. It is important to be on time. If a situation occurs where you may be delayed, please call:  [phone] on the day of surgery. The [facility] staff can be reached at [phone]. Special instructions: [address] DAY OF SURGERY. Current Outpatient Medications   Medication Sig    clopidogrel (PLAVIX) 75 MG tablet Take 1 tablet by mouth daily    ASPIRIN 81 PO Take 81 mg by mouth daily    Cholecalciferol (VITAMIN D3) 50 MCG (2000 UT) CAPS Take 2 capsules by mouth daily    Omega 3 1200 MG CAPS Take 1 capsule by mouth daily    calcium 600 MG TABS tablet Take 1 tablet by mouth daily    Multiple Vitamins-Minerals (MULTIVITAMIN ADULTS PO) Take 1 tablet by mouth daily    cetirizine (ZYRTEC) 10 MG tablet Take 1 tablet by mouth daily    rosuvastatin (CRESTOR) 20 MG tablet Take 1 tablet by mouth daily     No current facility-administered medications for this encounter. YOU MUST ONLY TAKE THESE MEDICATIONS THE MORNING OF SURGERY WITH A SIP OF WATER: ZYRTEC, ROSUVASTATIN    MEDICATIONS TO TAKE THE MORNING OF SURGERY ONLY IF NEEDED: NONE    HOLD THESE PRESCRIPTION MEDICATIONS BEFORE SURGERY: NONE    Ask your surgeon/prescribing physician about when/if to STOP taking these medications: PER PT, SHE IS TO CONTINUE TAKING HER PLAVIX AND ASPIRIN PER DR. GARCIA    Stop all vitamins, herbal medicines and any non-steroidal anti-inflammatory drugs (i.e. Ibuprofen, Naproxen, Advil, Aleve) 7 days prior to surgery. You may take Tylenol. If you are currently taking Aspirin, Plavix, Brilinta, Coumadin or any other blood-thinning/anticoagulant medication contact your surgeon for instructions. Patient Information Regarding COVID Restrictions    Day of Procedure    Please park in the parking deck or any designated visitor parking lot.   Enter the facility through the Main Entrance of the hospital.  On the day of surgery, please

## 2023-05-11 LAB
ABO + RH BLD: NORMAL
BLOOD GROUP ANTIBODIES SERPL: NORMAL
EKG ATRIAL RATE: 53 BPM
EKG DIAGNOSIS: NORMAL
EKG P AXIS: 31 DEGREES
EKG P-R INTERVAL: 162 MS
EKG Q-T INTERVAL: 408 MS
EKG QRS DURATION: 70 MS
EKG QTC CALCULATION (BAZETT): 382 MS
EKG R AXIS: -11 DEGREES
EKG T AXIS: -6 DEGREES
EKG VENTRICULAR RATE: 53 BPM
SPECIMEN EXP DATE BLD: NORMAL

## 2023-05-11 PROCEDURE — 93010 ELECTROCARDIOGRAM REPORT: CPT | Performed by: INTERNAL MEDICINE

## 2023-05-15 ENCOUNTER — TELEPHONE (OUTPATIENT)
Age: 73
End: 2023-05-15

## 2023-05-15 NOTE — TELEPHONE ENCOUNTER
You will have Treatment tomorrow at [facility] 1540 time is 8:00 am at Patient Registration. Reminder to patient:  -No eating or drinking after midnight the day prior to procedure.  -Take morning medications the morning of procedure with sips of water.   -Do not stop taking Blood Thinners if applicable unless notified by this office.  -You must have a  to drive you home upon discharge. -Recommend you have someone with you for at least 24-48 hours post procedure.  -No metal or glue in hair. Confirmed NKA. Patient stated understanding.

## 2023-05-16 ENCOUNTER — ANESTHESIA (OUTPATIENT)
Facility: HOSPITAL | Age: 73
DRG: 027 | End: 2023-05-16
Payer: MEDICAID

## 2023-05-16 ENCOUNTER — HOSPITAL ENCOUNTER (INPATIENT)
Facility: HOSPITAL | Age: 73
LOS: 1 days | Discharge: HOME | DRG: 027 | End: 2023-05-17
Attending: RADIOLOGY | Admitting: RADIOLOGY
Payer: MEDICAID

## 2023-05-16 ENCOUNTER — ANESTHESIA EVENT (OUTPATIENT)
Facility: HOSPITAL | Age: 73
DRG: 027 | End: 2023-05-16
Payer: MEDICAID

## 2023-05-16 DIAGNOSIS — H93.A9 PULSATILE TINNITUS: ICD-10-CM

## 2023-05-16 DIAGNOSIS — I67.1 CEREBRAL ANEURYSM: Primary | ICD-10-CM

## 2023-05-16 LAB
ACT BLD: 137 SECS (ref 79–138)
ACT BLD: 233 SECS (ref 79–138)
ACT BLD: 239 SECS (ref 79–138)
ASPIRIN: 388 ARU
P2Y12 PLT RESPONSE: 172 PRU (ref 194–418)

## 2023-05-16 PROCEDURE — 2500000003 HC RX 250 WO HCPCS: Performed by: RADIOLOGY

## 2023-05-16 PROCEDURE — 36620 INSERTION CATHETER ARTERY: CPT | Performed by: ANESTHESIOLOGY

## 2023-05-16 PROCEDURE — 85347 COAGULATION TIME ACTIVATED: CPT

## 2023-05-16 PROCEDURE — 6370000000 HC RX 637 (ALT 250 FOR IP): Performed by: RADIOLOGY

## 2023-05-16 PROCEDURE — 85576 BLOOD PLATELET AGGREGATION: CPT

## 2023-05-16 PROCEDURE — 2580000003 HC RX 258

## 2023-05-16 PROCEDURE — C1769 GUIDE WIRE: HCPCS

## 2023-05-16 PROCEDURE — 3700000000 HC ANESTHESIA ATTENDED CARE: Performed by: RADIOLOGY

## 2023-05-16 PROCEDURE — 6370000000 HC RX 637 (ALT 250 FOR IP)

## 2023-05-16 PROCEDURE — 2000000000 HC ICU R&B

## 2023-05-16 PROCEDURE — 2580000003 HC RX 258: Performed by: NURSE ANESTHETIST, CERTIFIED REGISTERED

## 2023-05-16 PROCEDURE — 3700000001 HC ADD 15 MINUTES (ANESTHESIA): Performed by: RADIOLOGY

## 2023-05-16 PROCEDURE — 37799 UNLISTED PX VASCULAR SURGERY: CPT

## 2023-05-16 PROCEDURE — C1760 CLOSURE DEV, VASC: HCPCS | Performed by: RADIOLOGY

## 2023-05-16 PROCEDURE — 2500000003 HC RX 250 WO HCPCS

## 2023-05-16 PROCEDURE — 2500000003 HC RX 250 WO HCPCS: Performed by: NURSE ANESTHETIST, CERTIFIED REGISTERED

## 2023-05-16 PROCEDURE — 03VG3DZ RESTRICTION OF INTRACRANIAL ARTERY WITH INTRALUMINAL DEVICE, PERCUTANEOUS APPROACH: ICD-10-PCS | Performed by: RADIOLOGY

## 2023-05-16 PROCEDURE — 6360000002 HC RX W HCPCS: Performed by: RADIOLOGY

## 2023-05-16 PROCEDURE — 6360000004 HC RX CONTRAST MEDICATION: Performed by: RADIOLOGY

## 2023-05-16 PROCEDURE — 36415 COLL VENOUS BLD VENIPUNCTURE: CPT

## 2023-05-16 PROCEDURE — 2580000003 HC RX 258: Performed by: RADIOLOGY

## 2023-05-16 PROCEDURE — 6360000002 HC RX W HCPCS: Performed by: NURSE ANESTHETIST, CERTIFIED REGISTERED

## 2023-05-16 RX ORDER — CLOPIDOGREL BISULFATE 75 MG/1
75 TABLET ORAL ONCE
Status: COMPLETED | OUTPATIENT
Start: 2023-05-16 | End: 2023-05-16

## 2023-05-16 RX ORDER — HEPARIN SODIUM 1000 [USP'U]/ML
INJECTION, SOLUTION INTRAVENOUS; SUBCUTANEOUS PRN
Status: DISCONTINUED | OUTPATIENT
Start: 2023-05-16 | End: 2023-05-16 | Stop reason: SDUPTHER

## 2023-05-16 RX ORDER — PROPOFOL 10 MG/ML
INJECTION, EMULSION INTRAVENOUS PRN
Status: DISCONTINUED | OUTPATIENT
Start: 2023-05-16 | End: 2023-05-16 | Stop reason: SDUPTHER

## 2023-05-16 RX ORDER — LIDOCAINE HYDROCHLORIDE 20 MG/ML
INJECTION, SOLUTION EPIDURAL; INFILTRATION; INTRACAUDAL; PERINEURAL PRN
Status: DISCONTINUED | OUTPATIENT
Start: 2023-05-16 | End: 2023-05-16 | Stop reason: SDUPTHER

## 2023-05-16 RX ORDER — ONDANSETRON 2 MG/ML
INJECTION INTRAMUSCULAR; INTRAVENOUS PRN
Status: DISCONTINUED | OUTPATIENT
Start: 2023-05-16 | End: 2023-05-16 | Stop reason: SDUPTHER

## 2023-05-16 RX ORDER — DEXAMETHASONE SODIUM PHOSPHATE 4 MG/ML
INJECTION, SOLUTION INTRA-ARTICULAR; INTRALESIONAL; INTRAMUSCULAR; INTRAVENOUS; SOFT TISSUE PRN
Status: DISCONTINUED | OUTPATIENT
Start: 2023-05-16 | End: 2023-05-16 | Stop reason: SDUPTHER

## 2023-05-16 RX ORDER — LIDOCAINE HYDROCHLORIDE 10 MG/ML
INJECTION, SOLUTION EPIDURAL; INFILTRATION; INTRACAUDAL; PERINEURAL PRN
Status: COMPLETED | OUTPATIENT
Start: 2023-05-16 | End: 2023-05-16

## 2023-05-16 RX ORDER — ROCURONIUM BROMIDE 10 MG/ML
INJECTION, SOLUTION INTRAVENOUS PRN
Status: DISCONTINUED | OUTPATIENT
Start: 2023-05-16 | End: 2023-05-16 | Stop reason: SDUPTHER

## 2023-05-16 RX ORDER — SODIUM CHLORIDE 9 MG/ML
INJECTION, SOLUTION INTRAVENOUS CONTINUOUS
Status: DISCONTINUED | OUTPATIENT
Start: 2023-05-16 | End: 2023-05-17

## 2023-05-16 RX ORDER — NEOSTIGMINE METHYLSULFATE 1 MG/ML
INJECTION, SOLUTION INTRAVENOUS PRN
Status: DISCONTINUED | OUTPATIENT
Start: 2023-05-16 | End: 2023-05-16 | Stop reason: SDUPTHER

## 2023-05-16 RX ORDER — GLYCOPYRROLATE 0.2 MG/ML
INJECTION INTRAMUSCULAR; INTRAVENOUS PRN
Status: DISCONTINUED | OUTPATIENT
Start: 2023-05-16 | End: 2023-05-16 | Stop reason: SDUPTHER

## 2023-05-16 RX ORDER — ACETAMINOPHEN 325 MG/1
650 TABLET ORAL EVERY 4 HOURS PRN
Status: DISCONTINUED | OUTPATIENT
Start: 2023-05-16 | End: 2023-05-17 | Stop reason: HOSPADM

## 2023-05-16 RX ORDER — ASPIRIN 81 MG/1
81 TABLET, CHEWABLE ORAL DAILY
Status: DISCONTINUED | OUTPATIENT
Start: 2023-05-17 | End: 2023-05-17

## 2023-05-16 RX ORDER — LABETALOL HYDROCHLORIDE 5 MG/ML
10 INJECTION, SOLUTION INTRAVENOUS EVERY 4 HOURS PRN
Status: DISCONTINUED | OUTPATIENT
Start: 2023-05-16 | End: 2023-05-17 | Stop reason: HOSPADM

## 2023-05-16 RX ORDER — CETIRIZINE HYDROCHLORIDE 10 MG/1
10 TABLET ORAL DAILY
Status: DISCONTINUED | OUTPATIENT
Start: 2023-05-16 | End: 2023-05-17 | Stop reason: HOSPADM

## 2023-05-16 RX ORDER — ROSUVASTATIN CALCIUM 10 MG/1
20 TABLET, COATED ORAL DAILY
Status: DISCONTINUED | OUTPATIENT
Start: 2023-05-16 | End: 2023-05-17 | Stop reason: HOSPADM

## 2023-05-16 RX ORDER — CLOPIDOGREL BISULFATE 75 MG/1
75 TABLET ORAL DAILY
Status: DISCONTINUED | OUTPATIENT
Start: 2023-05-17 | End: 2023-05-17 | Stop reason: HOSPADM

## 2023-05-16 RX ORDER — ONDANSETRON 2 MG/ML
4 INJECTION INTRAMUSCULAR; INTRAVENOUS EVERY 8 HOURS PRN
Status: DISCONTINUED | OUTPATIENT
Start: 2023-05-16 | End: 2023-05-17 | Stop reason: HOSPADM

## 2023-05-16 RX ORDER — BUTALBITAL, ACETAMINOPHEN AND CAFFEINE 50; 325; 40 MG/1; MG/1; MG/1
1 TABLET ORAL EVERY 4 HOURS PRN
Status: DISCONTINUED | OUTPATIENT
Start: 2023-05-16 | End: 2023-05-17 | Stop reason: HOSPADM

## 2023-05-16 RX ORDER — 0.9 % SODIUM CHLORIDE 0.9 %
INTRAVENOUS SOLUTION INTRAVENOUS PRN
Status: DISCONTINUED | OUTPATIENT
Start: 2023-05-16 | End: 2023-05-16 | Stop reason: SDUPTHER

## 2023-05-16 RX ADMIN — SUGAMMADEX 100 MG: 100 INJECTION, SOLUTION INTRAVENOUS at 12:58

## 2023-05-16 RX ADMIN — GLYCOPYRROLATE 0.4 MG: 0.2 INJECTION INTRAMUSCULAR; INTRAVENOUS at 12:48

## 2023-05-16 RX ADMIN — HEPARIN SODIUM: 5000 INJECTION INTRAVENOUS; SUBCUTANEOUS at 12:06

## 2023-05-16 RX ADMIN — ACETAMINOPHEN 650 MG: 325 TABLET, FILM COATED ORAL at 22:10

## 2023-05-16 RX ADMIN — IOPAMIDOL 75 ML: 612 INJECTION, SOLUTION INTRAVENOUS at 12:40

## 2023-05-16 RX ADMIN — CLOPIDOGREL BISULFATE 75 MG: 75 TABLET ORAL at 09:50

## 2023-05-16 RX ADMIN — Medication 2 MG: at 12:48

## 2023-05-16 RX ADMIN — ROCURONIUM BROMIDE 50 MG: 10 SOLUTION INTRAVENOUS at 11:38

## 2023-05-16 RX ADMIN — PHENYLEPHRINE HYDROCHLORIDE 40 MCG/MIN: 10 INJECTION INTRAVENOUS at 11:46

## 2023-05-16 RX ADMIN — ACETAMINOPHEN 650 MG: 325 TABLET, FILM COATED ORAL at 16:27

## 2023-05-16 RX ADMIN — LIDOCAINE HYDROCHLORIDE 8 ML: 10 INJECTION, SOLUTION EPIDURAL; INFILTRATION; INTRACAUDAL; PERINEURAL at 12:02

## 2023-05-16 RX ADMIN — SODIUM CHLORIDE: 9 INJECTION, SOLUTION INTRAVENOUS at 22:08

## 2023-05-16 RX ADMIN — HEPARIN SODIUM: 5000 INJECTION INTRAVENOUS; SUBCUTANEOUS at 12:05

## 2023-05-16 RX ADMIN — LABETALOL HYDROCHLORIDE 10 MG: 5 INJECTION INTRAVENOUS at 15:11

## 2023-05-16 RX ADMIN — HEPARIN SODIUM 1000 UNITS: 1000 INJECTION, SOLUTION INTRAVENOUS; SUBCUTANEOUS at 12:20

## 2023-05-16 RX ADMIN — LIDOCAINE HYDROCHLORIDE 60 MG: 20 INJECTION, SOLUTION EPIDURAL; INFILTRATION; INTRACAUDAL; PERINEURAL at 11:38

## 2023-05-16 RX ADMIN — HEPARIN SODIUM: 5000 INJECTION INTRAVENOUS; SUBCUTANEOUS at 12:07

## 2023-05-16 RX ADMIN — PROPOFOL 200 MG: 10 INJECTION, EMULSION INTRAVENOUS at 11:38

## 2023-05-16 RX ADMIN — ONDANSETRON HYDROCHLORIDE 4 MG: 2 INJECTION, SOLUTION INTRAMUSCULAR; INTRAVENOUS at 12:52

## 2023-05-16 RX ADMIN — DEXAMETHASONE SODIUM PHOSPHATE 8 MG: 4 INJECTION, SOLUTION INTRAMUSCULAR; INTRAVENOUS at 11:52

## 2023-05-16 RX ADMIN — HEPARIN SODIUM: 5000 INJECTION INTRAVENOUS; SUBCUTANEOUS at 12:08

## 2023-05-16 RX ADMIN — SODIUM CHLORIDE 600 ML: 9 INJECTION, SOLUTION INTRAVENOUS at 13:06

## 2023-05-16 RX ADMIN — SODIUM CHLORIDE: 9 INJECTION, SOLUTION INTRAVENOUS at 13:55

## 2023-05-16 RX ADMIN — HEPARIN SODIUM 5000 UNITS: 1000 INJECTION, SOLUTION INTRAVENOUS; SUBCUTANEOUS at 12:07

## 2023-05-16 ASSESSMENT — PAIN SCALES - GENERAL
PAINLEVEL_OUTOF10: 3
PAINLEVEL_OUTOF10: 3
PAINLEVEL_OUTOF10: 0

## 2023-05-16 ASSESSMENT — PAIN DESCRIPTION - ORIENTATION: ORIENTATION: LEFT

## 2023-05-16 ASSESSMENT — PAIN DESCRIPTION - DESCRIPTORS: DESCRIPTORS: ACHING

## 2023-05-16 ASSESSMENT — PAIN DESCRIPTION - LOCATION: LOCATION: BACK

## 2023-05-16 ASSESSMENT — PAIN - FUNCTIONAL ASSESSMENT: PAIN_FUNCTIONAL_ASSESSMENT: NONE - DENIES PAIN

## 2023-05-16 NOTE — OR NURSING
Patient placed on angio table with no assistance. IR Staff and anesthesia present at bedside. Anesthesia to remain at bedside to manage pt airway, medications, VS and pt status.

## 2023-05-16 NOTE — ANESTHESIA PRE PROCEDURE
Department of Anesthesiology  Preprocedure Note       Name:  Karon Baltazar   Age:  67 y.o.  :  1950                                          MRN:  [id number]         Date:  2023      Surgeon: * Surgery not found *    Procedure:     Medications prior to admission:   Prior to Admission medications    Medication Sig Start Date End Date Taking? Authorizing Provider   clopidogrel (PLAVIX) 75 MG tablet Take 1 tablet by mouth daily    Historical Provider, MD   ASPIRIN 81 PO Take 81 mg by mouth daily    Historical Provider, MD   Cholecalciferol (VITAMIN D3) 50 MCG (2000) CAPS Take 2 capsules by mouth daily    Historical Provider, MD   Omega 3 1200 MG CAPS Take 1 capsule by mouth daily    Historical Provider, MD   calcium 600 MG TABS tablet Take 1 tablet by mouth daily    Historical Provider, MD   Multiple Vitamins-Minerals (MULTIVITAMIN ADULTS PO) Take 1 tablet by mouth daily    Historical Provider, MD   cetirizine (ZYRTEC) 10 MG tablet Take 1 tablet by mouth daily    Ar Automatic Reconciliation   rosuvastatin (CRESTOR) 20 MG tablet Take 1 tablet by mouth daily    Ar Automatic Reconciliation       Current medications:    No current facility-administered medications for this encounter. Allergies:  No Known Allergies    Problem List:  There is no problem list on file for this patient.       Past Medical History:        Diagnosis Date    Arthritis     Bell's palsy     Cataract     History of cataract surgery     Hyperlipidemia     Osteopenia 2020       Past Surgical History:        Procedure Laterality Date    BREAST SURGERY Left     BIOPSY, BENIGN     SECTION      ,     COLONOSCOPY      EYE SURGERY Bilateral     CATARACTS       Social History:    Social History     Tobacco Use    Smoking status: Never    Smokeless tobacco: Never   Substance Use Topics    Alcohol use: Not Currently                                Counseling given: Not Answered      Vital Signs (Current):

## 2023-05-16 NOTE — PROGRESS NOTES
Neuro Critical Care Brief Progress Note    Subjective:  Patient seen laying flat and resting comfortably. She is drowsy post sedation and denies any symptoms of headache, nausea, vomiting, numbness or tingling. She does say her right arm \"feels different\" where the arterial line is located. NEURO:   Mental status: Patient is alert and oriented x 4. Cranial Nerves: Speech and language intact. Attention and fund of knowledge is intact. EOMI, PERRL, no nystagmus, no ptosis. No dysarthria or aphasia. Full facial strength, no  asymmetry. Hearing intact bilaterally. Shrug Shoulders 5/5 B/L and equally. Motor:  Normal bulk and tone, 5/5 strength in BL UE and LE;  No involuntary movements. Skin:  Right groin site is C/D/I, no hematoma, no bleeding and no drainage. Pulses are easily palpated at the right groin and pedal sites.   Reflexes:  Deferred   Sensation: Intact x 4 extremities to light touch  Gait:  Deferred       Signed By: Milo Fothergill, APRN - NP, Neurocritical Care Nurse Practitioner    May 16, 2023

## 2023-05-16 NOTE — ANESTHESIA PROCEDURE NOTES
Arterial Line:    An arterial line was placed using surface landmarks, in the procedure area for the following indication(s): continuous blood pressure monitoring and blood sampling needed. A 20 gauge (size) (length), Arrow (type) catheter was placed, Seldinger technique used, into the right radial artery, secured by Tegaderm. Anesthesia type: Local  Local infiltration: Injection    Events:  patient tolerated procedure well with no complications. 5/16/2023 10:10 AM5/16/2023 10:20 AM  Anesthesiologist: Kevin Smith MD  Performed: Anesthesiologist   Preanesthetic Checklist  Completed: patient identified, IV checked, site marked, risks and benefits discussed, surgical/procedural consents, equipment checked, pre-op evaluation, timeout performed, anesthesia consent given, oxygen available, monitors applied/VS acknowledged and fire risk safety assessment completed and verbalized

## 2023-05-16 NOTE — PROGRESS NOTES
TRANSFER - OUT REPORT:    Verbal report given to [facility] RN on [employer]  being transferred to ICU rm 20 for routine progression of patient care       Report consisted of patient's Situation, Background, Assessment and   Recommendations(SBAR). Information from the following report(s) Nurse Handoff Report, Surgery Report, Intake/Output, MAR, and Cardiac Rhythm NSR  was reviewed with the receiving nurse. Grayling Assessment: No data recorded  Lines:   Peripheral IV 05/16/23 Right Antecubital (Active)       Peripheral IV 05/16/23 Left Antecubital (Active)       Arterial Line 05/16/23 Right Radial (Active)        Opportunity for questions and clarification was provided.       Patient transported with:  Monitor, O2 @ 3lpm, and Registered Nurse  CRNA

## 2023-05-16 NOTE — BRIEF OP NOTE
Neuro-Interventional Surgery - Brief procedure note      Patient: Belinda Russo MRN: [id number]     YOB: 1950  Age: 67 y.o. Sex: female      Service: Neuro-Interventional Surgery    Date of Procedure: 5/16/2023    Pre-Procedure Diagnosis: Right PComm aneurysm    Post-Procedure Diagnosis: SAME    Procedure(s):  Right Pcomm aneurysm embolization    Vessels selected:  Right CCA, Right ICA, Right MCA    Brief Description of Procedure: Successful embolization of Right PComm aneurysm done using a 4 mm x 14 mm pipeline flow diversion stent. Right common femoral arterial puncture site hemostasis achieved by deploying 8 F Angio-Seal closure device without complications. No hematoma or oozing noted. Distal R lower extremity pulses remain unchanged post hemostasis. Sterile dressing applied over the puncture site. Anesthesia:General    Estimated Blood Loss:  20 ml. Specimens:  None    Implants:   4 mm  x 14 mm Pipeline Flex flow diversion stent    Apparent Intraoperative Complications:   None    Patient Condition:   Stable    Disposition:   ICU    Attestation:  I performed the procedure.         Signed By: Donnie Estrada MD     May 16, 2023     [facility] NEUROINTERVENTIONAL SURGERY

## 2023-05-16 NOTE — ANESTHESIA POSTPROCEDURE EVALUATION
Department of Anesthesiology  Postprocedure Note    Patient: Belinda Holt  MRN: [id number]  YOB: 1950  Date of evaluation: 5/16/2023      Procedure Summary     Date: 05/16/23 Room / Location: [facility] ANGIO IR    Anesthesia Start: 1127 Anesthesia Stop: 1039    Procedure: IR OCCLUSIVE OR EMBOL PERC CENTL NERV SYS Diagnosis:       Pulsatile tinnitus      Pulsatile tinnitus      Cerebral aneurysm      (aneurysm)    Scheduled Providers: Alison Manriquez MD Responsible Provider: Alison Manriquez MD    Anesthesia Type: General ASA Status: 3          Anesthesia Type: General    Cameron Phase I: Cameron Score: 10    Cameron Phase II:        Anesthesia Post Evaluation    Patient location during evaluation: PACU  Patient participation: complete - patient participated  Level of consciousness: awake  Pain score: 2  Airway patency: patent  Nausea & Vomiting: no nausea  Complications: no  Cardiovascular status: blood pressure returned to baseline  Respiratory status: acceptable  Hydration status: euvolemic

## 2023-05-16 NOTE — H&P
and naming. Cranial Nerves:   Pupils 3 mm, equal, round and reactive to light. Visual fields full to confrontation. Extraocular movements intact. Facial sensation intact. Full facial strength, no asymmetry. Hearing grossly intact bilaterally. No dysarthria. Tongue protrudes to midline, palate elevates symmetrically. Shoulder shrug 5/5 bilaterally. Motor:    No pronator drift. Bulk and tone normal.      5/5 power in all extremities proximally and distally. No involuntary movements. Sensation:    Sensation intact throughout to light touch. Coordination & Gait: Normal. FTN and HTS intact with no ataxia present. Labs:  Lab Results   Component Value Date/Time    WBC 3.2 05/10/2023 09:06 AM    HGB 12.7 05/10/2023 09:06 AM    HCT 40.7 05/10/2023 09:06 AM     05/10/2023 09:06 AM    MCV 96.9 05/10/2023 09:06 AM      Lab Results   Component Value Date/Time     05/10/2023 09:06 AM    K 4.0 05/10/2023 09:06 AM     05/10/2023 09:06 AM    CO2 29 05/10/2023 09:06 AM    BUN 15 05/10/2023 09:06 AM       Imaging:  Cerebral Angiogram (03/23/23): A broad-based, slightly lobulated aneurysm at the origin of the fetal type right posterior communicating artery, measuring approximately 5 mm x 4 mm in maximal diameter.       Assessment:     Pipeline embolization of Right Posterior Communicating Artery Aneurysm, measuring approximately 5 mm x 4 mm    Plan:       - Proceed with cerebral angiogram with right PCA aneurysm pipeline embolization today with Dr. Milton Espinoza to ICU post procedure              - Check P2Y12 and aspirin assays post to procedure              - q1 hour neuro checks               - SBP goal , cardene/labetalol PRN              - Cont DAPT with  mg and Plavix 75 mg daily    Diet: Regular  Activity: Up with assist after bedrest is completed  DVT prophylaxis: SCDs  Isolation precautions: None  Anticipated disposition:

## 2023-05-17 ENCOUNTER — APPOINTMENT (OUTPATIENT)
Facility: HOSPITAL | Age: 73
DRG: 027 | End: 2023-05-17
Attending: RADIOLOGY
Payer: MEDICAID

## 2023-05-17 VITALS
RESPIRATION RATE: 19 BRPM | HEIGHT: 62 IN | SYSTOLIC BLOOD PRESSURE: 122 MMHG | HEART RATE: 69 BPM | OXYGEN SATURATION: 99 % | WEIGHT: 163 LBS | DIASTOLIC BLOOD PRESSURE: 72 MMHG | BODY MASS INDEX: 30 KG/M2 | TEMPERATURE: 98.2 F

## 2023-05-17 LAB
ANION GAP SERPL CALC-SCNC: 5 MMOL/L (ref 5–15)
ASPIRIN: 400 ARU
BUN SERPL-MCNC: 10 MG/DL (ref 6–20)
BUN/CREAT SERPL: 15 (ref 12–20)
CALCIUM SERPL-MCNC: 8.4 MG/DL (ref 8.5–10.1)
CHLORIDE SERPL-SCNC: 114 MMOL/L (ref 97–108)
CO2 SERPL-SCNC: 23 MMOL/L (ref 21–32)
CREAT SERPL-MCNC: 0.68 MG/DL (ref 0.55–1.02)
ECHO BSA: 1.8 M2
GLUCOSE SERPL-MCNC: 110 MG/DL (ref 65–100)
P2Y12 PLT RESPONSE: 166 PRU (ref 194–418)
POTASSIUM SERPL-SCNC: 4 MMOL/L (ref 3.5–5.1)
SODIUM SERPL-SCNC: 142 MMOL/L (ref 136–145)
VAS RIGHT PFA PROX PSV: 50.2 CM/S
VAS RIGHT SFA PROX PSV: 54.1 CM/S

## 2023-05-17 PROCEDURE — 93926 LOWER EXTREMITY STUDY: CPT

## 2023-05-17 PROCEDURE — 36415 COLL VENOUS BLD VENIPUNCTURE: CPT

## 2023-05-17 PROCEDURE — 6370000000 HC RX 637 (ALT 250 FOR IP)

## 2023-05-17 PROCEDURE — 85576 BLOOD PLATELET AGGREGATION: CPT

## 2023-05-17 PROCEDURE — 80048 BASIC METABOLIC PNL TOTAL CA: CPT

## 2023-05-17 RX ORDER — ASPIRIN 325 MG
325 TABLET ORAL DAILY
Qty: 30 TABLET | Refills: 3 | Status: SHIPPED | OUTPATIENT
Start: 2023-05-17

## 2023-05-17 RX ORDER — ASPIRIN 325 MG
325 TABLET ORAL DAILY
Status: DISCONTINUED | OUTPATIENT
Start: 2023-05-17 | End: 2023-05-17 | Stop reason: HOSPADM

## 2023-05-17 RX ADMIN — CETIRIZINE HYDROCHLORIDE 10 MG: 10 TABLET, FILM COATED ORAL at 08:18

## 2023-05-17 RX ADMIN — ASPIRIN 81 MG CHEWABLE TABLET 81 MG: 81 TABLET CHEWABLE at 08:18

## 2023-05-17 RX ADMIN — ROSUVASTATIN 20 MG: 10 TABLET, FILM COATED ORAL at 08:18

## 2023-05-17 RX ADMIN — CLOPIDOGREL BISULFATE 75 MG: 75 TABLET ORAL at 08:18

## 2023-05-17 ASSESSMENT — PAIN SCALES - GENERAL
PAINLEVEL_OUTOF10: 0

## 2023-05-17 NOTE — CARE COORDINATION
Case Management Assessment  Initial Evaluation    Date/Time of Evaluation: 5/17/2023 11:41 AM  Assessment Completed by: Mey Maynard RN    If patient is discharged prior to next notation, then this note serves as note for discharge by case management. Patient Name: Odilia Elkins                   YOB: 1950  Diagnosis: Pulsatile tinnitus [H93. A9]  Cerebral aneurysm [I67.1]                   Date / Time: 5/16/2023  7:54 AM    Patient Admission Status: Inpatient   Readmission Risk (Low < 19, Mod (19-27), High > 27): Readmission Risk Score: 4.6    Current PCP: Letty Giron MD  PCP verified by CM? Yes (Dr Gabrielle García)    Chart Reviewed: Yes      History Provided by: Patient  Patient Orientation: Alert and Oriented, Person, Place, Situation, Self    Patient Cognition: Alert    Hospitalization in the last 30 days (Readmission):  No    If yes, Readmission Assessment in  Navigator will be completed. Advance Directives:      Code Status: Full Code   Patient's Primary Decision Maker is: Legal Next of Kin      Discharge Planning:    Patient lives with:   Type of Home:    Primary Care Giver: Self  Patient Support Systems include: Spouse/Significant Other (Spouse Elena Davis [phone])   Current Financial resources:  (Patient recieves Social Security and halfway)  Current community resources:    Current services prior to admission: None             Current DME:  None None            Type of Home Care services:   NA    ADLS  Prior functional level: Independent in ADLs/IADLs  Current functional level: Independent in ADLs/IADLs    Family can provide assistance at DC: Yes  Would you like Case Management to discuss the discharge plan with any other family members/significant others, and if so, who?  Yes (Spouse)  Plans to Return to Present Housing: Yes  Other Identified Issues/Barriers to RETURNING to current housing: Yes  Potential Assistance needed at discharge: None             Potential

## 2023-05-17 NOTE — PROGRESS NOTES
Shift summary:    -1145 - Arterial line removed per order. Pressure held for 8 minutes; bleeding stopped; gauze with pressure tape applied. Per Dr. Ciara Amor, d/c q 1 neuro checks.    -1330 - Pt assisted to bedside commode and ambulated in hallway per Dr. Ciara Amor. Pt tolerated well. Groin incision site intact with no signs of active bleeding. 80 - Education provided on discharge instructions/follow up care and signs and sx of a stroke and heart attack. NIHSS 0. VSS. Incision site intact. 1620 - Pt discharged, picked up by . Left with glasses, phone, and clothing.

## 2023-05-17 NOTE — DISCHARGE SUMMARY
Bulk and tone normal.                                       5/5 power in all extremities proximally and distally. No involuntary movements. Sensation:                   Sensation intact throughout to light touch     Reflexes:                     Deferred     Coordination & Gait:   Normal. FTN and HTS intact. DISCHARGE DIAGNOSES: Right Posterior Communicating Aneurysm, Pulsatile Tinnitus      FOLLOW UP APPOINTMENTS:     May 31, 11:30 am   Dr. Zacarias Alston:   Patient to avoid tub baths, swimming or hot tubs for two weeks. May take showers. Please call 911 and proceed to emergency room for any future difficulties with  balance, vision, weakness in the face, arm or leg, or speech difficulties. DIET: Normal diet     ACTIVITY: Do not lift anything over 10 lbs for two weeks. Try to limit going up and down stairs as much as possible. Rest and hydrate. May resume all physical activities as tolerated after 2 weeks. WOUND CARE: Monitor for signs and sx of infection including fever, expanding inflammation and discolored drainage. Report any changes in temperature in affected extremity, numbness, weakness or hematoma. If you experience any increased bruising or bleeding at your groin puncture site either outside or under the skin please apply direct, firm pressure for 20 minutes. Keep track of the bruising at the groin site by marking it with a pen or marker. If the bruising continues to be dark purple or blue in color OR is expanding , please call our office to let the on call doctor know. We have someone on call 24/7. DISCHARGE MEDICATIONS:      1. Continue Aspirin 325 mg daily  2. Continue Plavix 75 mg daily    Avoid NSAIDS including (Advil, motrin, ibuprofen, Celebrex etc ) while you are taking aspirin and plavix/Brilinta, you may take Tylenol for pain.         Medication List        CHANGE how you take these

## 2023-05-17 NOTE — PROGRESS NOTES
Neurocritical Care Brief Progress Note:  Rangel Arnold is a 67 y.o. female with a pmh of Right PComm aneurysm who is s/p cerebral angiogram with successful embolization using pipeline Flex flow diversion stent today by Dr. Juana Lopez. R femoral arteriotomy site is C/D/I. No oozing, hematoma, bruising, or tenderness. Pt complained of back pain which seems to improved with prn tylenol otherwise no other reported neuro issues. Neuro exams is intact. Pending morning labs: cbc, bmp, asa, and p2y12 tests. On asa 81 and Plavix 75mg daily. 2805:  Pt get oob to use the bedside commode without difficulties however, after using the commode, she felt a \"pop\" after getting off the commode. Upon evaluation, the pt had severe hematoma to her right femoral site. No oozing at the site. Pressure was applied for 5 minutes which seems to be slightly improved. Mild tender to touch. No pain, + pulses and ext warm to touch. Will obtain ultrasound stat. Pending morning labs. neuro exams intact. Review of Systems:   Admits back pain. No headache, eye, ear nose, throat problems; no coughing or wheezing or shortness of breath, No chest pain or orthopnea, no abdominal pain, nausea or vomiting, No pain in the body or extremities, no psychiatric, no other neurological issues, endocrine, hematological or cardiac complaints except as noted above. Physical Exam:   Blood pressure 118/79, pulse 76, temperature 98.4 °F (36.9 °C), temperature source Oral, resp. rate 12, height 1.575 m (5' 2\"), weight 73.9 kg (163 lb), SpO2 93 %. GEN: Cooperative, Calm, Well developed, well nourished patient in NAD  HEENT: Normocephalic. Non-icter, no congestion  Lungs:  CTA bilaterally Ant  Cardiac: S1,S2, normal rate and rhythm, with no murmurs.  no carotid bruits, no gallops  Abdomen: Normal bowel sounds, no distention, soft, non-tender  Extremities: 2+ Radial pulses, no clubbing, cyanosis, or edema  Skin: no rashes or lesions

## 2023-05-17 NOTE — PROGRESS NOTES
0600 - PT up to the toilet felt pulling sensation at groin site patient returned to chair immediately. Hematoma forming and firmness at angio site pressure held np bárbara notified orders received. 0630 - Pt RLE warm to touch, pulses present, no numbness tingling. Vascular paged for stat ultrasound of femoral artery.     4243 - ultrasound at bedside

## 2023-05-17 NOTE — PROGRESS NOTES
Spiritual Care Assessment/Progress Note  [address]    Name: Tigre Santamaria MRN: [id number]    Age: 67 y.o. Sex: female   Language: English     Date: 5/17/2023            Total Time Calculated: 10 min              Spiritual Assessment begun in [address] [place] [address] Provided For[de-identified] Patient and family together  Referral/Consult From[de-identified] Rounding  Encounter Overview/Reason : Initial Encounter    Spiritual beliefs:      [] Involved in a jovana tradition/spiritual practice: Barbara Means     [] Supported by a jovana community:      [] Claims no spiritual orientation:      [] Seeking spiritual identity:           [] Adheres to an individual form of spirituality:      [] Not able to assess:                Identified resources for coping and support system:           [] Prayer                  [] Devotional reading               [] Music                  [] Guided Imagery     [] Pet visits                                        [] Other: (COMMENT)     Specific area/focus of visit   Encounter: Type: Initial Screen/Assessment  Crisis:    Spiritual/Emotional needs: Type: Spiritual Support  Ritual, Rites and Sacraments:    Grief, Loss, and Adjustments:    Ethics/Mediation:    Behavioral Health:    Palliative Care: Advance Care Planning:           Narrative:    Referral source:    was rounding in the ICU. Tigre Santamaria at [facility] in [address] [place] [address]. I reviewed the medical record prior to this encounter. Spiritual Assessment:     No needs, patient shared about getting discharged. She shared she has good support as well. Outcome: Tigre Santamaria expressed appreciation for pastoral support and the opportunity to share their thoughts and feelings. Plan of Care: Patient is aware of  availability and was encouraged to request support as needed. Advised nurse to contact [facility] for any further referrals. The  on-call can be reached at ([phone]). Rev.

## 2023-05-31 ENCOUNTER — OFFICE VISIT (OUTPATIENT)
Age: 73
End: 2023-05-31
Payer: MEDICAID

## 2023-05-31 VITALS
OXYGEN SATURATION: 97 % | SYSTOLIC BLOOD PRESSURE: 120 MMHG | TEMPERATURE: 98.2 F | HEIGHT: 62 IN | DIASTOLIC BLOOD PRESSURE: 86 MMHG | BODY MASS INDEX: 29.81 KG/M2 | HEART RATE: 68 BPM

## 2023-05-31 DIAGNOSIS — I67.1 CEREBRAL ANEURYSM: Primary | ICD-10-CM

## 2023-05-31 PROCEDURE — 1111F DSCHRG MED/CURRENT MED MERGE: CPT | Performed by: RADIOLOGY

## 2023-05-31 PROCEDURE — 1036F TOBACCO NON-USER: CPT | Performed by: RADIOLOGY

## 2023-05-31 PROCEDURE — G8427 DOCREV CUR MEDS BY ELIG CLIN: HCPCS | Performed by: RADIOLOGY

## 2023-05-31 PROCEDURE — 1090F PRES/ABSN URINE INCON ASSESS: CPT | Performed by: RADIOLOGY

## 2023-05-31 PROCEDURE — G8400 PT W/DXA NO RESULTS DOC: HCPCS | Performed by: RADIOLOGY

## 2023-05-31 PROCEDURE — G8419 CALC BMI OUT NRM PARAM NOF/U: HCPCS | Performed by: RADIOLOGY

## 2023-05-31 PROCEDURE — 99213 OFFICE O/P EST LOW 20 MIN: CPT | Performed by: RADIOLOGY

## 2023-05-31 PROCEDURE — 1123F ACP DISCUSS/DSCN MKR DOCD: CPT | Performed by: RADIOLOGY

## 2023-05-31 PROCEDURE — 3017F COLORECTAL CA SCREEN DOC REV: CPT | Performed by: RADIOLOGY

## 2023-07-21 DIAGNOSIS — I67.1 CEREBRAL ANEURYSM: Primary | ICD-10-CM

## 2023-07-21 RX ORDER — ASPIRIN 325 MG
325 TABLET ORAL DAILY
Qty: 30 TABLET | Refills: 4 | Status: SHIPPED | OUTPATIENT
Start: 2023-07-21

## 2023-07-21 RX ORDER — CLOPIDOGREL BISULFATE 75 MG/1
75 TABLET ORAL DAILY
Qty: 90 TABLET | Refills: 1 | Status: SHIPPED | OUTPATIENT
Start: 2023-07-21

## 2023-07-24 NOTE — PROGRESS NOTES
Neurointerventional Surgery Clinic Note        Patient: Dakota Sheehan MRN: [id number]  SSN: [id number]    YOB: 1950  Age: 67 y.o. Sex: female      Subjective:      Dakota Sheehan is a 67 y.o. female who is being seen post discharge after treatment of the right posterior communicating artery origin aneurysm with flow diversion stenting. .    Past Medical History:   Diagnosis Date    Arthritis     Bell's palsy     Cataract     History of cataract surgery     Hyperlipidemia     Osteopenia 2020     Past Surgical History:   Procedure Laterality Date    BREAST SURGERY Left     BIOPSY, BENIGN     SECTION      ,     COLONOSCOPY      EYE SURGERY Bilateral     CATARACTS    IR OCCLUSIVE OR EMBOL PERC CENTL NERV SYS  2023    IR OCCLUSIVE OR EMBOL PERC CENTL NERV SYS 2023 [address] RAD ANGIO IR      Family History   Problem Relation Age of Onset    Heart Failure Mother     Stroke Father     Cancer Father     Cancer Brother     Heart Disease Other     Anesth Problems Neg Hx      Social History     Tobacco Use    Smoking status: Never    Smokeless tobacco: Never   Substance Use Topics    Alcohol use: Not Currently      Current Outpatient Medications   Medication Sig Dispense Refill    Cholecalciferol (VITAMIN D3) 50 MCG ( UT) CAPS Take 2 capsules by mouth daily      Omega 3 1200 MG CAPS Take 1 capsule by mouth daily      calcium 600 MG TABS tablet Take 1 tablet by mouth daily      Multiple Vitamins-Minerals (MULTIVITAMIN ADULTS PO) Take 1 tablet by mouth daily      cetirizine (ZYRTEC) 10 MG tablet Take 1 tablet by mouth daily      rosuvastatin (CRESTOR) 20 MG tablet Take 1 tablet by mouth daily      clopidogrel (PLAVIX) 75 MG tablet Take 1 tablet by mouth daily 90 tablet 1    aspirin 325 MG tablet Take 1 tablet by mouth daily 30 tablet 4     No current facility-administered medications for this visit.         No Known Allergies    Review of Systems:  Pertinent items are noted in the

## 2023-11-08 DIAGNOSIS — I67.1 CEREBRAL ANEURYSM: ICD-10-CM

## 2023-11-08 RX ORDER — DIPHENHYDRAMINE HCL 12.5MG/5ML
325 LIQUID (ML) ORAL DAILY
Qty: 90 TABLET | Refills: 1 | Status: SHIPPED | OUTPATIENT
Start: 2023-11-08

## 2023-11-20 ENCOUNTER — HOSPITAL ENCOUNTER (OUTPATIENT)
Facility: HOSPITAL | Age: 73
Discharge: HOME | End: 2023-11-23
Attending: RADIOLOGY
Payer: MEDICAID

## 2023-11-20 DIAGNOSIS — I67.1 CEREBRAL ANEURYSM: ICD-10-CM

## 2023-11-20 PROCEDURE — 70544 MR ANGIOGRAPHY HEAD W/O DYE: CPT

## 2023-11-29 ENCOUNTER — OFFICE VISIT (OUTPATIENT)
Age: 73
End: 2023-11-29
Payer: MEDICAID

## 2023-11-29 VITALS
DIASTOLIC BLOOD PRESSURE: 82 MMHG | TEMPERATURE: 97 F | BODY MASS INDEX: 29.81 KG/M2 | WEIGHT: 162 LBS | SYSTOLIC BLOOD PRESSURE: 126 MMHG | HEART RATE: 62 BPM | HEIGHT: 62 IN | OXYGEN SATURATION: 98 %

## 2023-11-29 DIAGNOSIS — I67.1 CEREBRAL ANEURYSM: Primary | ICD-10-CM

## 2023-11-29 PROCEDURE — G8419 CALC BMI OUT NRM PARAM NOF/U: HCPCS | Performed by: RADIOLOGY

## 2023-11-29 PROCEDURE — G8484 FLU IMMUNIZE NO ADMIN: HCPCS | Performed by: RADIOLOGY

## 2023-11-29 PROCEDURE — 3017F COLORECTAL CA SCREEN DOC REV: CPT | Performed by: RADIOLOGY

## 2023-11-29 PROCEDURE — G8400 PT W/DXA NO RESULTS DOC: HCPCS | Performed by: RADIOLOGY

## 2023-11-29 PROCEDURE — 99213 OFFICE O/P EST LOW 20 MIN: CPT | Performed by: RADIOLOGY

## 2023-11-29 PROCEDURE — 1090F PRES/ABSN URINE INCON ASSESS: CPT | Performed by: RADIOLOGY

## 2023-11-29 PROCEDURE — 1036F TOBACCO NON-USER: CPT | Performed by: RADIOLOGY

## 2023-11-29 PROCEDURE — 1123F ACP DISCUSS/DSCN MKR DOCD: CPT | Performed by: RADIOLOGY

## 2023-11-29 PROCEDURE — G8427 DOCREV CUR MEDS BY ELIG CLIN: HCPCS | Performed by: RADIOLOGY

## 2023-11-29 RX ORDER — PANTOPRAZOLE SODIUM 40 MG/1
TABLET, DELAYED RELEASE ORAL
COMMUNITY
Start: 2023-11-27

## 2023-11-29 RX ORDER — ESTRADIOL 0.1 MG/G
CREAM VAGINAL
COMMUNITY
Start: 2023-10-25

## 2023-11-29 NOTE — PATIENT INSTRUCTIONS
Follow up in 3 months, February 2024 for a follow up Diagnostic angiogram.  You will be notified in January 2024 of date and time.  Stop taking Plavix 75 mg daily.  Continue taking Aspirin 325 mg daily.  Referral to Neurology for  numbness.

## 2024-02-14 ENCOUNTER — TELEPHONE (OUTPATIENT)
Age: 74
End: 2024-02-14

## 2024-02-14 ENCOUNTER — PATIENT MESSAGE (OUTPATIENT)
Age: 74
End: 2024-02-14

## 2024-02-14 DIAGNOSIS — I67.1 CEREBRAL ANEURYSM: Primary | ICD-10-CM

## 2024-02-14 DIAGNOSIS — I67.1 CEREBRAL ANEURYSM: ICD-10-CM

## 2024-02-14 NOTE — TELEPHONE ENCOUNTER
Spoke to patient who was at [facility]. [facility] informed patient they could not draw her blood work.  Spoke to tech at [facility] and orders were faxed to their office.  Patient is able to have labs drawn.

## 2024-02-15 LAB
ALBUMIN SERPL-MCNC: 4.4 G/DL (ref 3.8–4.8)
ALBUMIN/GLOB SERPL: 2 {RATIO} (ref 1.2–2.2)
ALP SERPL-CCNC: 85 IU/L (ref 44–121)
ALT SERPL-CCNC: 42 IU/L (ref 0–32)
AST SERPL-CCNC: 36 IU/L (ref 0–40)
BILIRUB SERPL-MCNC: 1 MG/DL (ref 0–1.2)
BUN SERPL-MCNC: 13 MG/DL (ref 8–27)
BUN/CREAT SERPL: 14 (ref 12–28)
CALCIUM SERPL-MCNC: 9.8 MG/DL (ref 8.7–10.3)
CHLORIDE SERPL-SCNC: 104 MMOL/L (ref 96–106)
CO2 SERPL-SCNC: 20 MMOL/L (ref 20–29)
CREAT SERPL-MCNC: 0.9 MG/DL (ref 0.57–1)
EGFRCR SERPLBLD CKD-EPI 2021: 67 ML/MIN/1.73
ERYTHROCYTE [DISTWIDTH] IN BLOOD BY AUTOMATED COUNT: 12.4 % (ref 11.7–15.4)
GLOBULIN SER CALC-MCNC: 2.2 G/DL (ref 1.5–4.5)
GLUCOSE SERPL-MCNC: 85 MG/DL (ref 70–99)
HCT VFR BLD AUTO: 40.2 % (ref 34–46.6)
HGB BLD-MCNC: 13.2 G/DL (ref 11.1–15.9)
MCH RBC QN AUTO: 30.7 PG (ref 26.6–33)
MCHC RBC AUTO-ENTMCNC: 32.8 G/DL (ref 31.5–35.7)
MCV RBC AUTO: 94 FL (ref 79–97)
PLATELET # BLD AUTO: 220 X10E3/UL (ref 150–450)
POTASSIUM SERPL-SCNC: 4.4 MMOL/L (ref 3.5–5.2)
PROT SERPL-MCNC: 6.6 G/DL (ref 6–8.5)
RBC # BLD AUTO: 4.3 X10E6/UL (ref 3.77–5.28)
SODIUM SERPL-SCNC: 141 MMOL/L (ref 134–144)
WBC # BLD AUTO: 7.6 X10E3/UL (ref 3.4–10.8)

## 2024-02-19 NOTE — PROGRESS NOTES
Follow up for Cerebral aneurysm and imaging review.  Patient reports no symptoms at this time.  Denies headaches, dizziness, numbness or tingling, blurred or double vision.  No acute problems reported.  
mouth daily      rosuvastatin (CRESTOR) 20 MG tablet Take 1 tablet by mouth daily       No current facility-administered medications for this visit.        No Known Allergies    Review of Systems:  Pertinent items are noted in the History of Present Illness.    Objective:     Vitals:    11/29/23 0908   BP: 126/82   Site: Left Upper Arm   Position: Sitting   Pulse: 62   Temp: 97 °F (36.1 °C)   TempSrc: Temporal   SpO2: 98%   Weight: 73.5 kg (162 lb)   Height: 1.575 m (5' 2\")        Physical Exam:  GENERAL: alert, cooperative, no distress, appears stated age  LUNG: clear to auscultation bilaterally  HEART: regular rate and rhythm  EXTREMITIES:  extremities normal, atraumatic, no cyanosis or edema    Neurologic Exam:  Mental Status:  Alert and oriented x 4.  Appropriate affect, mood and behavior.       Language:    Normal fluency, repetition, comprehension and naming.    Cranial Nerves:   EOMI, PERRLA      Facial sensation intact V1 - V3.     Full facial strength, no asymmetry.      Hearing intact bilaterally.     No dysarthria. Tongue protrudes to midline  symmetrically.      Shoulder shrug 5/5 bilaterally.    Motor:    No pronator drift.      Bulk and tone normal.      5/5 power in all extremities proximally and distally.     No involuntary movements.    Sensation:    Sensation intact throughout to light touch    Reflexes:    Deferred    Coordination & Gait: Normal      My interpretation of Imaging:       I have reviewed patient's most recent MR angiogram dated 11/20/2023.  The MRA demonstrates no residual aneurysm at the right posterior communicating artery origin.  The flow diversion stent is patent.    Assessment:     Given the findings of the MR angiogram which demonstrated no residual aneurysm, no additional imaging or intervention is recommended at this time.  A follow-up catheter arteriogram to be performed in approximately 3 months to assess the status of the aneurysm and the flow diversion stent.    Patient

## 2024-02-21 ENCOUNTER — TELEPHONE (OUTPATIENT)
Age: 74
End: 2024-02-21

## 2024-02-21 NOTE — TELEPHONE ENCOUNTER
Spoke to patient to confirm procedure tomorrow.   Arrival time  am at Patient registration.  Reminder to patient:  -No eating or drinking after midnight the day prior to procedure.   -Take morning medications the morning of procedure with sips of water.   -Do not stop taking Blood Thinners if applicable unless notified by this office.  -You must have a  to drive you home upon discharge.  -Recommend you have someone with you for at least 24-48 hours post procedure.  -No metal of glue in hair.    Allergies confirmed.  Patient stated understanding.

## 2024-02-22 ENCOUNTER — HOSPITAL ENCOUNTER (OUTPATIENT)
Facility: HOSPITAL | Age: 74
Discharge: HOME | End: 2024-02-22
Attending: RADIOLOGY | Admitting: RADIOLOGY
Payer: MEDICAID

## 2024-02-22 VITALS
HEIGHT: 62 IN | TEMPERATURE: 98.2 F | HEART RATE: 68 BPM | WEIGHT: 158.7 LBS | OXYGEN SATURATION: 96 % | RESPIRATION RATE: 12 BRPM | BODY MASS INDEX: 29.21 KG/M2 | DIASTOLIC BLOOD PRESSURE: 80 MMHG | SYSTOLIC BLOOD PRESSURE: 136 MMHG

## 2024-02-22 DIAGNOSIS — I67.1 CEREBRAL ANEURYSM: ICD-10-CM

## 2024-02-22 PROCEDURE — 6360000002 HC RX W HCPCS: Performed by: RADIOLOGY

## 2024-02-22 PROCEDURE — 2500000003 HC RX 250 WO HCPCS: Performed by: RADIOLOGY

## 2024-02-22 PROCEDURE — 76942 ECHO GUIDE FOR BIOPSY: CPT

## 2024-02-22 PROCEDURE — 6360000004 HC RX CONTRAST MEDICATION: Performed by: RADIOLOGY

## 2024-02-22 PROCEDURE — APPNB45 APP NON BILLABLE 31-45 MINUTES: Performed by: NURSE PRACTITIONER

## 2024-02-22 PROCEDURE — 99152 MOD SED SAME PHYS/QHP 5/>YRS: CPT

## 2024-02-22 RX ORDER — FENTANYL CITRATE 50 UG/ML
INJECTION, SOLUTION INTRAMUSCULAR; INTRAVENOUS PRN
Status: COMPLETED | OUTPATIENT
Start: 2024-02-22 | End: 2024-02-22

## 2024-02-22 RX ORDER — HEPARIN SODIUM 5000 [USP'U]/ML
INJECTION, SOLUTION INTRAVENOUS; SUBCUTANEOUS PRN
Status: COMPLETED | OUTPATIENT
Start: 2024-02-22 | End: 2024-02-22

## 2024-02-22 RX ORDER — LIDOCAINE HYDROCHLORIDE 10 MG/ML
INJECTION, SOLUTION EPIDURAL; INFILTRATION; INTRACAUDAL; PERINEURAL PRN
Status: COMPLETED | OUTPATIENT
Start: 2024-02-22 | End: 2024-02-22

## 2024-02-22 RX ORDER — VERAPAMIL HYDROCHLORIDE 2.5 MG/ML
INJECTION, SOLUTION INTRAVENOUS PRN
Status: COMPLETED | OUTPATIENT
Start: 2024-02-22 | End: 2024-02-22

## 2024-02-22 RX ORDER — MIDAZOLAM HYDROCHLORIDE 2 MG/2ML
INJECTION, SOLUTION INTRAMUSCULAR; INTRAVENOUS PRN
Status: COMPLETED | OUTPATIENT
Start: 2024-02-22 | End: 2024-02-22

## 2024-02-22 RX ADMIN — HEPARIN SODIUM 2000 UNITS: 5000 INJECTION INTRAVENOUS; SUBCUTANEOUS at 12:51

## 2024-02-22 RX ADMIN — LIDOCAINE HYDROCHLORIDE 1 ML: 10 INJECTION, SOLUTION EPIDURAL; INFILTRATION; INTRACAUDAL; PERINEURAL at 12:38

## 2024-02-22 RX ADMIN — MIDAZOLAM HYDROCHLORIDE 1 MG: 1 INJECTION, SOLUTION INTRAMUSCULAR; INTRAVENOUS at 12:38

## 2024-02-22 RX ADMIN — FENTANYL CITRATE 50 MCG: 50 INJECTION, SOLUTION INTRAMUSCULAR; INTRAVENOUS at 12:38

## 2024-02-22 RX ADMIN — Medication 200 MCG: at 12:52

## 2024-02-22 RX ADMIN — IOPAMIDOL 32 ML: 612 INJECTION, SOLUTION INTRAVENOUS at 12:51

## 2024-02-22 RX ADMIN — VERAPAMIL HYDROCHLORIDE 2.5 MG: 2.5 INJECTION, SOLUTION INTRAVENOUS at 12:51

## 2024-02-22 ASSESSMENT — PULMONARY FUNCTION TESTS
PIF_VALUE: 0

## 2024-02-22 NOTE — BRIEF OP NOTE
Neuro-Interventional Surgery - Brief procedure note      Patient: Eva Alvarado MRN: [id number]     YOB: 1950  Age: 73 y.o.  Sex: female      Service: Neuro-Interventional Surgery    Date of Procedure: 2/22/2024    Pre-Procedure Diagnosis: Intracranial aneurysm    Post-Procedure Diagnosis: SAME    Procedure(s): Catheter cerebral/cervical arteriogram    Vessels selected: Right vertebral artery and Right common carotid artery    Brief Description of Procedure: No residual/recurrence involving the embolized right posterior communicating artery aneurysm noted.  No additional aneurysm, AV malformation or hemodynamically significant stenosis noted.    Anesthesia:Moderate Sedation    Estimated Blood Loss:  5 ml.    Specimens:  None    Implants:  None    Apparent Intraoperative Complications:  None immediate    Patient Condition:  Stable    Disposition:  Same day recovery unit    Attestation:  I performed the procedure.        Signed By: Baldemar Snyder MD     February 22, 2024     [facility] NEUROINTERVENTIONAL SURGERY

## 2024-02-22 NOTE — DISCHARGE INSTRUCTIONS
heart, or liver disease and have to limit fluids, talk with your doctor before you increase the amount of fluids you drink.     Keep eating a heart-healthy diet that has lots of fruits, vegetables, and whole grains. If you need help with your diet, talk to your doctor. You also may want to talk to a dietitian. This expert can help you to learn about healthy foods and plan meals.   Medicines    You can restart your medicines. You will also be given instructions about taking any new medicines.     If you stopped taking aspirin or some other blood thinner, your doctor will tell you when to start taking it again.     Be safe with medicines. Read and follow all instructions on the label.  If the doctor gave you a prescription medicine for pain, take it as prescribed.  If you are not taking a prescription pain medicine, ask your doctor if you can take an over-the-counter medicine.   Care of the catheter site    For the first 3 days, keep a bandage over the spot where the catheter was put in.     Put ice or a cold pack on the area for 10 to 20 minutes at a time. Try to do this every 1 to 2 hours for the next 3 days (when you are awake) or until the swelling goes down. Put a thin cloth between the ice and your skin.     You may shower 24 to 48 hours after the procedure, if your doctor okays it. Pat the incision dry.     Do not soak the catheter site until it is healed. Don't take a bath for 1 week, or until your doctor tells you it is okay.     Watch for bleeding from the site. A small amount of blood (up to the size of a quarter) on the bandage can be normal.     If you are bleeding, lie down and press on the area for 15 minutes to try to make it stop. If the bleeding does not stop, call your doctor or seek immediate medical care.   Follow-up care is a key part of your treatment and safety. Be sure to make and go to all appointments, and call your doctor if you are having problems. It's also a good idea to know your test

## 2024-02-22 NOTE — H&P
220 02/14/2024 02:23 PM    MCV 94 02/14/2024 02:23 PM     Lab Results   Component Value Date/Time    MCH 30.7 02/14/2024 02:23 PM    MCHC 32.8 02/14/2024 02:23 PM    SEGS 43 05/10/2023 09:06 AM       Recent IMAGING:  MRA of Head 11/20/23:   Status post stent assisted coil embolization of previously demonstrated right  posterior commentating artery origin aneurysm.  No evidence of residual or new intracranial aneurysm identified. Continued  attention on follow-up advised.     No intracranial  large vessel occlusion or significant luminal narrowing.  Assessment/Plan:   This is a 73 y.o. female who presents to angio today for a 3 month follow up diagnostic cerebral angiogram s/p flow diversion stent embolization of a right posterior communicating artery origin aneurysm on 5/16/2023.     I have discussed the risks, benefits and alternatives of the cerebral angiogram with the patient in detail. The risks discussed included but were not limited to bleeding, infection, blood vessel damage/dissection, stroke with transient or permanent weakness, numbness or other deficit, speech and language dysfunction, vision loss, brain damage and death. Risk of renal failure from contrast or allergic reaction to contrast dye was also discussed. All questions were answered thoroughly. The patient expressed understanding and elected to proceed with endovascular diagnostic angiogram. Written informed consent was obtained.         BRANDEN Will NP

## 2024-02-22 NOTE — PRE SEDATION
Sedation Pre-Procedure Note    Patient Name: Eva Alvarado   YOB: 1950  Room/Bed: AIR/  Medical Record Number: [id number]  Date: 2024   Time: 10:20 AM       Indication:  Diagnostic cerebral angiogram, follow up for treated cerebral aneurysm    Consent: I have discussed with the patient and/or the patient representative the indication, alternatives, and the possible risks and/or complications of the planned procedure and the anesthesia methods. The patient and/or patient representative appear to understand and agree to proceed.    Vital Signs:   Vitals:    24 0900   BP: 129/77   Pulse: 65   Resp: 15   Temp: 97.9 °F (36.6 °C)   SpO2: 96%       Past Medical History:   has a past medical history of Arthritis, Bell's palsy, Cataract, History of cataract surgery, Hyperlipidemia, and Osteopenia.    Past Surgical History:   has a past surgical history that includes  section; Colonoscopy; Breast surgery (Left); eye surgery (Bilateral); and IR OCCLUSION/EMBOLIZATION PERC CNS (2023).    Medications:   Scheduled Meds:   Continuous Infusions:   PRN Meds:   Home Meds:   Prior to Admission medications    Medication Sig Start Date End Date Taking? Authorizing Provider   estradiol (ESTRACE) 0.1 MG/GM vaginal cream APPLY 1/2 GRAM INTO VAGINA THREE TIMES A WEEK AS DIRECTED A PEA SIZE AMOUNT 10/25/23   Zaria Torres MD   pantoprazole (PROTONIX) 40 MG tablet  23   Zaria Torres MD   MAGNESIUM PO Take by mouth daily    Zaria Torres MD   [facility] GENUINE ASPIRIN 325 MG tablet TAKE 1 TABLET BY MOUTH EVERY DAY 23   Baldemar Snyder MD   clopidogrel (PLAVIX) 75 MG tablet Take 1 tablet by mouth daily  Patient not taking: Reported on 2024   Baldemar Snyder MD   Cholecalciferol (VITAMIN D3) 50 MCG ( UT) CAPS Take 2 capsules by mouth daily    Zaria Torres MD   Omega 3 1200 MG CAPS Take 1 capsule by mouth daily    Zaria Torres MD   calcium

## 2024-02-22 NOTE — PROGRESS NOTES
Procedure: diagnostic cerebral angiogram    Sedation:1 milligram versed and 50 micrograms fentanyl    Site:right radial    Discharge Details: discharge teaching completed with patient and spouse, understanding verbalized, vitals stable, TR band discontinued and site clean, dry, and intact, patient discharged home via wheelchair with spouse

## 2024-03-20 ENCOUNTER — OFFICE VISIT (OUTPATIENT)
Age: 74
End: 2024-03-20
Payer: MEDICAID

## 2024-03-20 VITALS
OXYGEN SATURATION: 99 % | DIASTOLIC BLOOD PRESSURE: 78 MMHG | WEIGHT: 157 LBS | TEMPERATURE: 98.1 F | HEIGHT: 62 IN | SYSTOLIC BLOOD PRESSURE: 120 MMHG | HEART RATE: 68 BPM | BODY MASS INDEX: 28.89 KG/M2

## 2024-03-20 DIAGNOSIS — I67.1 CEREBRAL ANEURYSM: Primary | ICD-10-CM

## 2024-03-20 PROCEDURE — 99213 OFFICE O/P EST LOW 20 MIN: CPT | Performed by: RADIOLOGY

## 2024-03-20 PROCEDURE — 1090F PRES/ABSN URINE INCON ASSESS: CPT | Performed by: RADIOLOGY

## 2024-03-20 PROCEDURE — G8427 DOCREV CUR MEDS BY ELIG CLIN: HCPCS | Performed by: RADIOLOGY

## 2024-03-20 PROCEDURE — 1123F ACP DISCUSS/DSCN MKR DOCD: CPT | Performed by: RADIOLOGY

## 2024-03-20 PROCEDURE — G8419 CALC BMI OUT NRM PARAM NOF/U: HCPCS | Performed by: RADIOLOGY

## 2024-03-20 PROCEDURE — G8400 PT W/DXA NO RESULTS DOC: HCPCS | Performed by: RADIOLOGY

## 2024-03-20 PROCEDURE — G8484 FLU IMMUNIZE NO ADMIN: HCPCS | Performed by: RADIOLOGY

## 2024-03-20 PROCEDURE — 3017F COLORECTAL CA SCREEN DOC REV: CPT | Performed by: RADIOLOGY

## 2024-03-20 PROCEDURE — 1036F TOBACCO NON-USER: CPT | Performed by: RADIOLOGY

## 2024-05-15 ENCOUNTER — TELEPHONE (OUTPATIENT)
Age: 74
End: 2024-05-15

## 2024-05-15 DIAGNOSIS — I67.1 CEREBRAL ANEURYSM: Primary | ICD-10-CM

## 2024-05-16 RX ORDER — ASPIRIN 81 MG/1
81 TABLET ORAL DAILY
Qty: 30 TABLET | Refills: 5 | Status: SHIPPED | OUTPATIENT
Start: 2024-05-16

## 2024-05-16 NOTE — TELEPHONE ENCOUNTER
Spoke to patient and informed her clearance for [facility] has been signed and faxed to their office.  Informed patient per provider, Aspirin cannot be stopped. Reduce Aspirin to 81 ng daily.  Stop taking Aspirin 325 mg.  Patient stated understanding.

## 2024-06-05 DIAGNOSIS — I67.1 CEREBRAL ANEURYSM: ICD-10-CM

## 2024-06-05 RX ORDER — DIPHENHYDRAMINE HCL 12.5MG/5ML
325 LIQUID (ML) ORAL DAILY
Qty: 90 TABLET | Refills: 1 | OUTPATIENT
Start: 2024-06-05

## 2024-07-28 ENCOUNTER — CLINICAL DOCUMENTATION (OUTPATIENT)
Age: 74
End: 2024-07-28

## 2024-08-28 DIAGNOSIS — I67.1 CEREBRAL ANEURYSM: ICD-10-CM

## 2024-08-28 RX ORDER — ASPIRIN 81 MG/1
81 TABLET, COATED ORAL DAILY
Qty: 90 TABLET | Refills: 1 | Status: SHIPPED | OUTPATIENT
Start: 2024-08-28

## 2024-10-03 NOTE — PROGRESS NOTES
Neurointerventional Surgery Clinic Note        Patient: Eva Alvarado MRN: [id number]  SSN: [id number]    YOB: 1950  Age: 73 y.o.  Sex: female      Subjective:      Eva Alvarado is a 73 y.o. female who is being seen for follow-up.  Patient has had flow diversion stent embolization of right posterior communicating artery origin aneurysm and underwent a 6-month follow-up catheter arteriogram on 2024..    Past Medical History:   Diagnosis Date    Arthritis     Bell's palsy     Cataract     History of cataract surgery     Hyperlipidemia     Osteopenia 2020     Past Surgical History:   Procedure Laterality Date    BREAST SURGERY Left     BIOPSY, BENIGN     SECTION      ,     COLONOSCOPY      EYE SURGERY Bilateral     CATARACTS    IR OCCLUSIVE OR EMBOL PERC CENTL NERV SYS  2023    IR OCCLUSIVE OR EMBOL PERC CENTL NERV SYS 2023 [facility] RAD ANGIO IR      Family History   Problem Relation Age of Onset    Heart Failure Mother     Stroke Father     Cancer Father     Cancer Brother     Heart Disease Other     Anesth Problems Neg Hx      Social History     Tobacco Use    Smoking status: Never    Smokeless tobacco: Never   Substance Use Topics    Alcohol use: Not Currently      Current Outpatient Medications   Medication Sig Dispense Refill    estradiol (ESTRACE) 0.1 MG/GM vaginal cream APPLY 1/2 GRAM INTO VAGINA THREE TIMES A WEEK AS DIRECTED A PEA SIZE AMOUNT      pantoprazole (PROTONIX) 40 MG tablet       MAGNESIUM PO Take by mouth daily      Cholecalciferol (VITAMIN D3) 50 MCG ( UT) CAPS Take 2 capsules by mouth daily      Omega 3 1200 MG CAPS Take 1 capsule by mouth daily      calcium 600 MG TABS tablet Take 1 tablet by mouth daily      Multiple Vitamins-Minerals (MULTIVITAMIN ADULTS PO) Take 1 tablet by mouth daily      cetirizine (ZYRTEC) 10 MG tablet Take 1 tablet by mouth daily      rosuvastatin (CRESTOR) 20 MG tablet Take 1 tablet by mouth daily      ASPIRIN LOW

## 2025-01-08 ENCOUNTER — OFFICE VISIT (OUTPATIENT)
Age: 75
End: 2025-01-08
Payer: MEDICARE

## 2025-01-08 VITALS
BODY MASS INDEX: 29.63 KG/M2 | SYSTOLIC BLOOD PRESSURE: 132 MMHG | RESPIRATION RATE: 18 BRPM | HEIGHT: 62 IN | DIASTOLIC BLOOD PRESSURE: 84 MMHG | TEMPERATURE: 97.2 F | WEIGHT: 161 LBS | HEART RATE: 62 BPM | OXYGEN SATURATION: 97 %

## 2025-01-08 DIAGNOSIS — R74.8 ELEVATED LIVER ENZYMES: ICD-10-CM

## 2025-01-08 DIAGNOSIS — Z11.59 ENCOUNTER FOR SCREENING FOR OTHER VIRAL DISEASES: ICD-10-CM

## 2025-01-08 DIAGNOSIS — Z72.89 OTHER PROBLEMS RELATED TO LIFESTYLE: ICD-10-CM

## 2025-01-08 DIAGNOSIS — K75.81 METABOLIC DYSFUNCTION-ASSOCIATED STEATOHEPATITIS (MASH): Primary | ICD-10-CM

## 2025-01-08 PROBLEM — E55.9 VITAMIN D DEFICIENCY: Status: ACTIVE | Noted: 2025-01-08

## 2025-01-08 PROBLEM — E78.5 HYPERLIPIDEMIA, UNSPECIFIED HYPERLIPIDEMIA TYPE: Status: ACTIVE | Noted: 2025-01-08

## 2025-01-08 PROBLEM — K76.0 STEATOSIS OF LIVER: Status: ACTIVE | Noted: 2025-01-08

## 2025-01-08 PROBLEM — F32.A DEPRESSIVE DISORDER: Status: ACTIVE | Noted: 2025-01-08

## 2025-01-08 PROBLEM — K21.9 GASTROESOPHAGEAL REFLUX DISEASE WITHOUT ESOPHAGITIS: Status: ACTIVE | Noted: 2025-01-08

## 2025-01-08 PROBLEM — G51.0 BELL'S PALSY: Status: ACTIVE | Noted: 2025-01-08

## 2025-01-08 PROCEDURE — 91200 LIVER ELASTOGRAPHY: CPT | Performed by: NURSE PRACTITIONER

## 2025-01-08 PROCEDURE — 99204 OFFICE O/P NEW MOD 45 MIN: CPT | Performed by: NURSE PRACTITIONER

## 2025-01-08 RX ORDER — B-COMPLEX WITH VITAMIN C
600 TABLET ORAL DAILY
COMMUNITY

## 2025-01-08 ASSESSMENT — PATIENT HEALTH QUESTIONNAIRE - PHQ9
SUM OF ALL RESPONSES TO PHQ9 QUESTIONS 1 & 2: 0
SUM OF ALL RESPONSES TO PHQ QUESTIONS 1-9: 0
2. FEELING DOWN, DEPRESSED OR HOPELESS: NOT AT ALL
SUM OF ALL RESPONSES TO PHQ QUESTIONS 1-9: 0
SUM OF ALL RESPONSES TO PHQ QUESTIONS 1-9: 0
1. LITTLE INTEREST OR PLEASURE IN DOING THINGS: NOT AT ALL
SUM OF ALL RESPONSES TO PHQ QUESTIONS 1-9: 0

## 2025-01-08 NOTE — PROGRESS NOTES
Identified pt with two pt identifiers(name and ). Reviewed record in preparation for visit and have obtained necessary documentation. All patient medications has been reviewed.  Chief Complaint   Patient presents with    Follow-up               Wt Readings from Last 3 Encounters:   25 73 kg (161 lb)   24 71.2 kg (157 lb)   24 72 kg (158 lb 11.2 oz)     Temp Readings from Last 3 Encounters:   25 97.2 °F (36.2 °C) (Temporal)   24 98.1 °F (36.7 °C) (Temporal)   24 98.2 °F (36.8 °C) (Oral)     BP Readings from Last 3 Encounters:   25 132/84   24 120/78   24 136/80     Pulse Readings from Last 3 Encounters:   25 62   24 68   24 68       \"Have you been to the ER, urgent care clinic since your last visit?  Hospitalized since your last visit?\"    NO    “Have you seen or consulted any other health care providers outside of [facility] since your last visit?”    NO    Click Here for Release of Records Request       
MG CAPS 1 capsule, Oral, DAILY    pantoprazole (PROTONIX) 40 MG tablet No dose, route, or frequency recorded.    rosuvastatin (CRESTOR) 20 mg, Oral, DAILY         SYSTEM REVIEW NOT RELATED TO LIVER DISEASE OR REVIEWED ABOVE:  Constitution systems: Negative for fever, chills, weight gain, weight loss.   Eyes: Negative for visual changes.  ENT: Negative for sore throat, painful swallowing.   Respiratory: Negative for cough, hemoptysis, SOB.   Cardiology: Negative for chest pain, palpitations.  GI:  Negative for constipation or diarrhea.  : Negative for urinary frequency, dysuria, hematuria, nocturia.   Skin: Negative for rash.  Hematology: Negative for easy bruising, blood clots.    Musculo-skelatal: Negative for back pain, muscle pain, weakness.  Neurologic: Negative for headaches, dizziness, vertigo, memory problems not related to HE.  Psychology: Negative for anxiety, depression.       FAMILY HISTORY:  The father had the following following chronic disease(s): CVA, lung cancer, colon cancer, MI, hyperlipidemia. .The mother had the following chronic disease(s): CHF, HTN, hyperlipidemia.    The following family members have liver disease: Brother,  from HCC ISO MASH cirrhosis.       SOCIAL HISTORY:  The patient is .    The patient has 2 children.  The patient has never used tobacco products.    The patient has never consumed significant amounts of alcohol.    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         PHYSICAL EXAMINATION:  /84 (Site: Left Upper Arm, Position: Sitting)   Pulse 62   Temp 97.2 °F (36.2 °C) (Temporal)   Resp 18   Ht 1.575 m (5' 2\")   Wt 73 kg (161 lb)   SpO2 97%   BMI 29.45 kg/m²       General: No acute distress.   Eyes: Sclera anicteric.   ENT: No oral lesions.  Thyroid normal.  Nodes: No adenopathy.   Skin: No spider angiomata.  No jaundice.  No palmar erythema.  Respiratory: Lungs clear to auscultation.   Cardiovascular: Regular heart

## 2025-01-09 LAB
ALBUMIN SERPL-MCNC: 3.7 G/DL (ref 3.5–5)
ALBUMIN/GLOB SERPL: 1.2 (ref 1.1–2.2)
ALP SERPL-CCNC: 89 U/L (ref 45–117)
ALT SERPL-CCNC: 80 U/L (ref 12–78)
ANION GAP SERPL CALC-SCNC: 5 MMOL/L (ref 2–12)
AST SERPL-CCNC: 48 U/L (ref 15–37)
BASOPHILS # BLD: 0.01 K/UL (ref 0–0.1)
BASOPHILS NFR BLD: 0.2 % (ref 0–1)
BILIRUB DIRECT SERPL-MCNC: 0.2 MG/DL (ref 0–0.2)
BILIRUB SERPL-MCNC: 1 MG/DL (ref 0.2–1)
BUN SERPL-MCNC: 19 MG/DL (ref 6–20)
BUN/CREAT SERPL: 23 (ref 12–20)
CALCIUM SERPL-MCNC: 9.4 MG/DL (ref 8.5–10.1)
CHLORIDE SERPL-SCNC: 107 MMOL/L (ref 97–108)
CO2 SERPL-SCNC: 28 MMOL/L (ref 21–32)
CREAT SERPL-MCNC: 0.83 MG/DL (ref 0.55–1.02)
DIFFERENTIAL METHOD BLD: NORMAL
EOSINOPHIL # BLD: 0.15 K/UL (ref 0–0.4)
EOSINOPHIL NFR BLD: 2.7 % (ref 0–7)
ERYTHROCYTE [DISTWIDTH] IN BLOOD BY AUTOMATED COUNT: 12.5 % (ref 11.5–14.5)
FERRITIN SERPL-MCNC: 50 NG/ML (ref 8–252)
GLOBULIN SER CALC-MCNC: 3 G/DL (ref 2–4)
GLUCOSE SERPL-MCNC: 122 MG/DL (ref 65–100)
HBV SURFACE AB SER QL: NONREACTIVE
HBV SURFACE AB SER-ACNC: <3.1 MIU/ML
HBV SURFACE AG SER QL: <0.1 INDEX
HBV SURFACE AG SER QL: NEGATIVE
HCT VFR BLD AUTO: 39.7 % (ref 35–47)
HGB BLD-MCNC: 12.9 G/DL (ref 11.5–16)
IMM GRANULOCYTES # BLD AUTO: 0.02 K/UL (ref 0–0.04)
IMM GRANULOCYTES NFR BLD AUTO: 0.4 % (ref 0–0.5)
IRON SATN MFR SERPL: 28 % (ref 20–50)
IRON SERPL-MCNC: 98 UG/DL (ref 35–150)
LYMPHOCYTES # BLD: 1.39 K/UL (ref 0.8–3.5)
LYMPHOCYTES NFR BLD: 24.8 % (ref 12–49)
MCH RBC QN AUTO: 31.1 PG (ref 26–34)
MCHC RBC AUTO-ENTMCNC: 32.5 G/DL (ref 30–36.5)
MCV RBC AUTO: 95.7 FL (ref 80–99)
MONOCYTES # BLD: 0.38 K/UL (ref 0–1)
MONOCYTES NFR BLD: 6.8 % (ref 5–13)
NEUTS SEG # BLD: 3.65 K/UL (ref 1.8–8)
NEUTS SEG NFR BLD: 65.1 % (ref 32–75)
NRBC # BLD: 0 K/UL (ref 0–0.01)
NRBC BLD-RTO: 0 PER 100 WBC
PLATELET # BLD AUTO: 238 K/UL (ref 150–400)
PMV BLD AUTO: 10.5 FL (ref 8.9–12.9)
POTASSIUM SERPL-SCNC: 4.2 MMOL/L (ref 3.5–5.1)
PROT SERPL-MCNC: 6.7 G/DL (ref 6.4–8.2)
RBC # BLD AUTO: 4.15 M/UL (ref 3.8–5.2)
SODIUM SERPL-SCNC: 140 MMOL/L (ref 136–145)
TIBC SERPL-MCNC: 349 UG/DL (ref 250–450)
WBC # BLD AUTO: 5.6 K/UL (ref 3.6–11)

## 2025-01-10 LAB
A1AT SERPL-MCNC: 141 MG/DL (ref 101–187)
CERULOPLASMIN SERPL-MCNC: 23.6 MG/DL (ref 19–39)
HAV AB SER QL IA: NEGATIVE
HBV CORE AB SERPL QL IA: NEGATIVE
HCV AB SERPL QL IA: NORMAL
HCV IGG SERPL QL IA: NON REACTIVE S/CO RATIO
MITOCHONDRIA M2 IGG SER-ACNC: <20 UNITS (ref 0–20)
SMA IGG SER-ACNC: 6 UNITS (ref 0–19)

## 2025-01-20 ENCOUNTER — OFFICE VISIT (OUTPATIENT)
Age: 75
End: 2025-01-20
Payer: MEDICARE

## 2025-01-20 VITALS
HEIGHT: 62 IN | OXYGEN SATURATION: 99 % | BODY MASS INDEX: 29.88 KG/M2 | WEIGHT: 162.4 LBS | TEMPERATURE: 98.1 F | HEART RATE: 64 BPM | DIASTOLIC BLOOD PRESSURE: 73 MMHG | SYSTOLIC BLOOD PRESSURE: 116 MMHG

## 2025-01-20 DIAGNOSIS — K76.0 METABOLIC DYSFUNCTION-ASSOCIATED STEATOTIC LIVER DISEASE (MASLD): Primary | ICD-10-CM

## 2025-01-20 PROCEDURE — G8419 CALC BMI OUT NRM PARAM NOF/U: HCPCS | Performed by: NURSE PRACTITIONER

## 2025-01-20 PROCEDURE — 91200 LIVER ELASTOGRAPHY: CPT | Performed by: NURSE PRACTITIONER

## 2025-01-20 PROCEDURE — 1036F TOBACCO NON-USER: CPT | Performed by: NURSE PRACTITIONER

## 2025-01-20 PROCEDURE — G8400 PT W/DXA NO RESULTS DOC: HCPCS | Performed by: NURSE PRACTITIONER

## 2025-01-20 PROCEDURE — 1160F RVW MEDS BY RX/DR IN RCRD: CPT | Performed by: NURSE PRACTITIONER

## 2025-01-20 PROCEDURE — 1090F PRES/ABSN URINE INCON ASSESS: CPT | Performed by: NURSE PRACTITIONER

## 2025-01-20 PROCEDURE — 1159F MED LIST DOCD IN RCRD: CPT | Performed by: NURSE PRACTITIONER

## 2025-01-20 PROCEDURE — 3017F COLORECTAL CA SCREEN DOC REV: CPT | Performed by: NURSE PRACTITIONER

## 2025-01-20 PROCEDURE — 1126F AMNT PAIN NOTED NONE PRSNT: CPT | Performed by: NURSE PRACTITIONER

## 2025-01-20 PROCEDURE — 1123F ACP DISCUSS/DSCN MKR DOCD: CPT | Performed by: NURSE PRACTITIONER

## 2025-01-20 PROCEDURE — 99214 OFFICE O/P EST MOD 30 MIN: CPT | Performed by: NURSE PRACTITIONER

## 2025-01-20 PROCEDURE — G8427 DOCREV CUR MEDS BY ELIG CLIN: HCPCS | Performed by: NURSE PRACTITIONER

## 2025-01-20 ASSESSMENT — PATIENT HEALTH QUESTIONNAIRE - PHQ9
7. TROUBLE CONCENTRATING ON THINGS, SUCH AS READING THE NEWSPAPER OR WATCHING TELEVISION: NOT AT ALL
8. MOVING OR SPEAKING SO SLOWLY THAT OTHER PEOPLE COULD HAVE NOTICED. OR THE OPPOSITE, BEING SO FIGETY OR RESTLESS THAT YOU HAVE BEEN MOVING AROUND A LOT MORE THAN USUAL: NOT AT ALL
9. THOUGHTS THAT YOU WOULD BE BETTER OFF DEAD, OR OF HURTING YOURSELF: NOT AT ALL
3. TROUBLE FALLING OR STAYING ASLEEP: NOT AT ALL
2. FEELING DOWN, DEPRESSED OR HOPELESS: NOT AT ALL
5. POOR APPETITE OR OVEREATING: NOT AT ALL
SUM OF ALL RESPONSES TO PHQ9 QUESTIONS 1 & 2: 0
SUM OF ALL RESPONSES TO PHQ QUESTIONS 1-9: 0
SUM OF ALL RESPONSES TO PHQ QUESTIONS 1-9: 0
4. FEELING TIRED OR HAVING LITTLE ENERGY: NOT AT ALL
SUM OF ALL RESPONSES TO PHQ QUESTIONS 1-9: 0
1. LITTLE INTEREST OR PLEASURE IN DOING THINGS: NOT AT ALL
DEPRESSION UNABLE TO ASSESS: FUNCTIONAL CAPACITY MOTIVATION LIMITS ACCURACY
6. FEELING BAD ABOUT YOURSELF - OR THAT YOU ARE A FAILURE OR HAVE LET YOURSELF OR YOUR FAMILY DOWN: NOT AT ALL
10. IF YOU CHECKED OFF ANY PROBLEMS, HOW DIFFICULT HAVE THESE PROBLEMS MADE IT FOR YOU TO DO YOUR WORK, TAKE CARE OF THINGS AT HOME, OR GET ALONG WITH OTHER PEOPLE: NOT DIFFICULT AT ALL
SUM OF ALL RESPONSES TO PHQ QUESTIONS 1-9: 0

## 2025-01-20 ASSESSMENT — ANXIETY QUESTIONNAIRES
7. FEELING AFRAID AS IF SOMETHING AWFUL MIGHT HAPPEN: NOT AT ALL
6. BECOMING EASILY ANNOYED OR IRRITABLE: NOT AT ALL
IF YOU CHECKED OFF ANY PROBLEMS ON THIS QUESTIONNAIRE, HOW DIFFICULT HAVE THESE PROBLEMS MADE IT FOR YOU TO DO YOUR WORK, TAKE CARE OF THINGS AT HOME, OR GET ALONG WITH OTHER PEOPLE: NOT DIFFICULT AT ALL
1. FEELING NERVOUS, ANXIOUS, OR ON EDGE: NOT AT ALL
3. WORRYING TOO MUCH ABOUT DIFFERENT THINGS: NOT AT ALL
5. BEING SO RESTLESS THAT IT IS HARD TO SIT STILL: NOT AT ALL
2. NOT BEING ABLE TO STOP OR CONTROL WORRYING: NOT AT ALL
4. TROUBLE RELAXING: NOT AT ALL
GAD7 TOTAL SCORE: 0

## 2025-01-20 NOTE — PROGRESS NOTES
Chief Complaint   Patient presents with    Follow-up     Vitals:    01/20/25 0741   BP: 116/73   Pulse: 64   Temp: 98.1 °F (36.7 °C)   SpO2: 99%     \"Have you been to the ER, urgent care clinic since your last visit?  Hospitalized since your last visit?\"    NO    “Have you seen or consulted any other health care providers outside our system since your last visit?”    NO      “Have you had a colorectal cancer screening such as a colonoscopy/FIT/Cologuard?    NO    No colonoscopy on file  No cologuard on file  No FIT/FOBT on file   No flexible sigmoidoscopy on file            
for easy bruising, blood clots.    Musculo-skelatal: Negative for back pain, muscle pain, weakness.  Neurologic: Negative for headaches, dizziness, vertigo, memory problems not related to HE.  Psychology: Negative for anxiety, depression.       FAMILY HISTORY:  The father had the following following chronic disease(s): CVA, lung cancer, colon cancer, MI, hyperlipidemia.   The mother had the following chronic disease(s): CHF, HTN, hyperlipidemia.    The following family members have liver disease: Brother,  from HCC ISO MASH cirrhosis.       SOCIAL HISTORY:  The patient is .    The patient has 2 children.  The patient has never used tobacco products.    The patient has never consumed significant amounts of alcohol.    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       PHYSICAL EXAMINATION:  /73 (Site: Right Upper Arm, Position: Sitting, Cuff Size: Medium Adult)   Pulse 64   Temp 98.1 °F (36.7 °C)   Ht 1.575 m (5' 2\")   Wt 73.7 kg (162 lb 6.4 oz)   SpO2 99%   BMI 29.70 kg/m²       General: No acute distress.   Eyes: Sclera anicteric.   ENT: No oral lesions.  Thyroid normal.  Nodes: No adenopathy.   Skin: No spider angiomata.  No jaundice.  No palmar erythema.  Respiratory: Lungs clear to auscultation.   Cardiovascular: Regular heart rate.  No murmurs.  No JVD.  Abdomen: Soft non-tender, liver size normal to percussion/palpation.  Spleen not palpable. No obvious ascites.  Extremities: No edema.  No muscle wasting.  No gross arthritic changes.  Neurologic: Alert and oriented.  Cranial nerves grossly intact.  No asterixis.      LABORATORY STUDIES:   Latest Ref Rn 2025   JENN[facility] - Routine Labs     WBC 3.6 - 11.0 K/uL 5.6    ANC 1.80 - 8.00 K/UL 3.65    HGB 11.5 - 16.0 g/dL 12.9     - 400 K/uL 238    AST 15 - 37 U/L 48 (H)    ALT 12 - 78 U/L 80 (H)    Alk Phos 45 - 117 U/L 89    Bili, Total 0.2 - 1.0 MG/DL 1.0    Bili, Direct 0.0 - 0.2 MG/DL 0.2    Albumin 3.5 - 5.0

## 2025-02-08 DIAGNOSIS — I67.1 CEREBRAL ANEURYSM: ICD-10-CM

## 2025-02-10 RX ORDER — ASPIRIN 81 MG/1
81 TABLET, COATED ORAL DAILY
Qty: 90 TABLET | Refills: 1 | Status: SHIPPED | OUTPATIENT
Start: 2025-02-10

## 2025-02-11 ENCOUNTER — HOSPITAL ENCOUNTER (OUTPATIENT)
Facility: HOSPITAL | Age: 75
Discharge: HOME | End: 2025-02-14
Attending: RADIOLOGY
Payer: MEDICARE

## 2025-02-11 DIAGNOSIS — I67.1 CEREBRAL ANEURYSM: ICD-10-CM

## 2025-02-11 PROCEDURE — 70544 MR ANGIOGRAPHY HEAD W/O DYE: CPT

## 2025-02-17 ENCOUNTER — OFFICE VISIT (OUTPATIENT)
Age: 75
End: 2025-02-17
Payer: MEDICARE

## 2025-02-17 VITALS
BODY MASS INDEX: 29.63 KG/M2 | DIASTOLIC BLOOD PRESSURE: 82 MMHG | TEMPERATURE: 97.8 F | WEIGHT: 161 LBS | HEIGHT: 62 IN | HEART RATE: 73 BPM | OXYGEN SATURATION: 98 % | SYSTOLIC BLOOD PRESSURE: 120 MMHG

## 2025-02-17 DIAGNOSIS — I67.1 CEREBRAL ANEURYSM: Primary | ICD-10-CM

## 2025-02-17 PROCEDURE — 1036F TOBACCO NON-USER: CPT | Performed by: NURSE PRACTITIONER

## 2025-02-17 PROCEDURE — G8427 DOCREV CUR MEDS BY ELIG CLIN: HCPCS | Performed by: NURSE PRACTITIONER

## 2025-02-17 PROCEDURE — 99213 OFFICE O/P EST LOW 20 MIN: CPT | Performed by: NURSE PRACTITIONER

## 2025-02-17 PROCEDURE — 1090F PRES/ABSN URINE INCON ASSESS: CPT | Performed by: NURSE PRACTITIONER

## 2025-02-17 PROCEDURE — G8419 CALC BMI OUT NRM PARAM NOF/U: HCPCS | Performed by: NURSE PRACTITIONER

## 2025-02-17 PROCEDURE — 1159F MED LIST DOCD IN RCRD: CPT | Performed by: NURSE PRACTITIONER

## 2025-02-17 PROCEDURE — G8400 PT W/DXA NO RESULTS DOC: HCPCS | Performed by: NURSE PRACTITIONER

## 2025-02-17 PROCEDURE — 1123F ACP DISCUSS/DSCN MKR DOCD: CPT | Performed by: NURSE PRACTITIONER

## 2025-02-17 PROCEDURE — 3017F COLORECTAL CA SCREEN DOC REV: CPT | Performed by: NURSE PRACTITIONER

## 2025-02-17 NOTE — PROGRESS NOTES
Clinic Note    Patient: Eva Alvarado MRN: [id number] SSN: [id number]    YOB: 1950  Age: 74 y.o.  Sex: female      Subjective:      Eva Alvarado is a 74 y.o. White (non-) female established patient with arthritis, cataracts, hyperlipidemia, and cerebral aneurysm. She underwent flow diversion stent embolization of right posterior communicating artery origin aneurysm by Dr Snyder on 23.     She presents to clinic today for follow up. She reports she has been doing well overall. She denies and headaches. She denies any new neurologic symptoms. She has been compliant with ASA daily.     Past Medical History:   Diagnosis Date    Arthritis     Bell's palsy     Cataract     History of cataract surgery     Hyperlipidemia     Osteopenia 2020     Past Surgical History:   Procedure Laterality Date    BREAST SURGERY Left     BIOPSY, BENIGN     SECTION      ,     COLONOSCOPY      EYE SURGERY Bilateral     CATARACTS    IR OCCLUSION/EMBOLIZATION PERC CNS  2023    IR OCCLUSIVE OR EMBOL PERC CENTL NERV SYS 2023 [facility] RAD ANGIO IR      Family History   Problem Relation Age of Onset    Heart Failure Mother     Arthritis Mother     Heart Disease Mother     High Cholesterol Mother     Osteoarthritis Mother     Stroke Father     Cancer Father     Arthritis Father     Heart Attack Father     High Cholesterol Father     Osteoarthritis Father     Cancer Brother     Arthritis Brother     Gout Brother     Kidney Disease Brother     Heart Disease Other     Anesth Problems Neg Hx      Social History     Tobacco Use    Smoking status: Never    Smokeless tobacco: Never   Substance Use Topics    Alcohol use: Not Currently      Current Outpatient Medications   Medication Sig Dispense Refill    Levocetirizine Dihydrochloride (XYZAL PO) Take by mouth daily      ASPIRIN LOW DOSE 81 MG EC tablet TAKE 1 TABLET BY MOUTH EVERY DAY 90 tablet 1    Calcium Carbonate-Vitamin D 600-5

## 2025-02-17 NOTE — PROGRESS NOTES
Annual follow up for Cerebral aneurysm and imaging review.  Patient reports no symptoms.  Denies headaches, dizziness, numbness or tingling, blurred or double vision.  No new problems reported.

## 2025-02-17 NOTE — PATIENT INSTRUCTIONS
Follow up in 3 years, February 2028, after MRA imaging is completed.  See attached paperwork to schedule imaging.

## 2025-07-30 ENCOUNTER — OFFICE VISIT (OUTPATIENT)
Age: 75
End: 2025-07-30
Payer: MEDICARE

## 2025-07-30 VITALS
TEMPERATURE: 98.8 F | BODY MASS INDEX: 30.36 KG/M2 | SYSTOLIC BLOOD PRESSURE: 127 MMHG | DIASTOLIC BLOOD PRESSURE: 75 MMHG | WEIGHT: 165 LBS | HEIGHT: 62 IN | HEART RATE: 63 BPM | OXYGEN SATURATION: 96 %

## 2025-07-30 DIAGNOSIS — K76.0 METABOLIC DYSFUNCTION-ASSOCIATED STEATOTIC LIVER DISEASE (MASLD): Primary | ICD-10-CM

## 2025-07-30 PROCEDURE — 91200 LIVER ELASTOGRAPHY: CPT | Performed by: NURSE PRACTITIONER

## 2025-07-30 PROCEDURE — 99214 OFFICE O/P EST MOD 30 MIN: CPT | Performed by: NURSE PRACTITIONER

## 2025-07-30 RX ORDER — ESCITALOPRAM OXALATE 10 MG/1
10 TABLET ORAL DAILY
COMMUNITY
Start: 2025-07-25

## 2025-07-30 ASSESSMENT — PATIENT HEALTH QUESTIONNAIRE - PHQ9
10. IF YOU CHECKED OFF ANY PROBLEMS, HOW DIFFICULT HAVE THESE PROBLEMS MADE IT FOR YOU TO DO YOUR WORK, TAKE CARE OF THINGS AT HOME, OR GET ALONG WITH OTHER PEOPLE: NOT DIFFICULT AT ALL
3. TROUBLE FALLING OR STAYING ASLEEP: NOT AT ALL
6. FEELING BAD ABOUT YOURSELF - OR THAT YOU ARE A FAILURE OR HAVE LET YOURSELF OR YOUR FAMILY DOWN: NOT AT ALL
2. FEELING DOWN, DEPRESSED OR HOPELESS: NOT AT ALL
SUM OF ALL RESPONSES TO PHQ QUESTIONS 1-9: 0
7. TROUBLE CONCENTRATING ON THINGS, SUCH AS READING THE NEWSPAPER OR WATCHING TELEVISION: NOT AT ALL
1. LITTLE INTEREST OR PLEASURE IN DOING THINGS: NOT AT ALL
SUM OF ALL RESPONSES TO PHQ QUESTIONS 1-9: 0
DEPRESSION UNABLE TO ASSESS: FUNCTIONAL CAPACITY MOTIVATION LIMITS ACCURACY
5. POOR APPETITE OR OVEREATING: NOT AT ALL
SUM OF ALL RESPONSES TO PHQ QUESTIONS 1-9: 0
SUM OF ALL RESPONSES TO PHQ QUESTIONS 1-9: 0
9. THOUGHTS THAT YOU WOULD BE BETTER OFF DEAD, OR OF HURTING YOURSELF: NOT AT ALL
8. MOVING OR SPEAKING SO SLOWLY THAT OTHER PEOPLE COULD HAVE NOTICED. OR THE OPPOSITE, BEING SO FIGETY OR RESTLESS THAT YOU HAVE BEEN MOVING AROUND A LOT MORE THAN USUAL: NOT AT ALL
4. FEELING TIRED OR HAVING LITTLE ENERGY: NOT AT ALL

## 2025-07-30 NOTE — PROGRESS NOTES
Chief Complaint   Patient presents with    Follow-up     N/A     Vitals:    07/30/25 0754   BP: 127/75   BP Site: Left Upper Arm   Patient Position: Sitting   Pulse: 63   Temp: 98.8 °F (37.1 °C)   TempSrc: Temporal   SpO2: 96%   Weight: 74.8 kg (165 lb)   Height: 1.575 m (5' 2\")     .  \"Have you been to the ER, urgent care clinic since your last visit?  Hospitalized since your last visit?\"    NO    “Have you seen or consulted any other health care providers outside of [facility] since your last visit?”    NO        “Have you had a colorectal cancer screening such as a colonoscopy/FIT/Cologuard?    NO    No colonoscopy on file  No cologuard on file  No FIT/FOBT on file   No flexible sigmoidoscopy on file         Click Here for Release of Records Request    
arthritic changes.  Neurologic: Alert and oriented.  Cranial nerves grossly intact.  No asterixis.      LABORATORY STUDIES:  From 7/2025  AST/ALT/ALP/T Bili/ALB:  44/54/78/1.2/4.4  WBC/HB/PLT/INR:  4.7/13.7/242/x  NA/BUN/CREAT:  140/14/0.84     Latest Ref Rng 1/8/2025   [facility] - Routine Labs     WBC 3.6 - 11.0 K/uL 5.6    ANC 1.80 - 8.00 K/UL 3.65    HGB 11.5 - 16.0 g/dL 12.9     - 400 K/uL 238    AST 15 - 37 U/L 48 (H)    ALT 12 - 78 U/L 80 (H)    Alk Phos 45 - 117 U/L 89    Bili, Total 0.2 - 1.0 MG/DL 1.0    Bili, Direct 0.0 - 0.2 MG/DL 0.2    Albumin 3.5 - 5.0 g/dL 3.7    BUN 6 - 20 MG/DL 19    Creat 0.55 - 1.02 MG/DL 0.83    Na 136 - 145 mmol/L 140    K 3.5 - 5.1 mmol/L 4.2    Cl 97 - 108 mmol/L 107    CO2 21 - 32 mmol/L 28    Glucose 65 - 100 mg/dL 122 (H)        From 7/2024  AST/ALT/ALP/T Bili/ALB:  40/54/88/0.83/3.8  WBC/HB/PLT/INR:  4.91/12.8/228/x  NA/BUN/CREAT:  141/19/0.85  GGT 36    SEROLOGIES:   Latest Ref Rng 1/8/2025   JENNIF[facility] - Serologies     Hep A Ab, Total Negative   Negative    Hep B Surface Ag Index <0.10    Hep B Core Ab, Total Negative   Negative    Hep B Surface Ab mIU/mL <3.10    Hep B S Ab Interp NONREACTIVE   NONREACTIVE    Hep C Ab Non Reactive s/co ratio Non Reactive    Ferritin 8 - 252 NG/ML 50    Iron % Saturation 20 - 50 % 28    ASMCA 0 - 19 Units 6    M2 Ab 0.0 - 20.0 Units <20.0    Ceruloplasmin 19.0 - 39.0 mg/dL 23.6    Alpha-1 antitrypsin level 101 - 187 mg/dL 141          LIVER HISTOLOGY:  1/2025.  FibroScan performed at [facility]. EkPa was 9.2.  IQR/med 13%.  .   The results suggested a fibrosis level of F2.  The CAP score suggests there is hepatic steatosis.    1/2025.  FibroScan performed at [facility]. EkPa was 7.9.  IQR/med 5%.  .   The results suggested a fibrosis level of F0-1. The CAP score suggests there is borderline hepatic steatosis.    7/2025.  FibroScan performed at [facility]. EkPa was 6.5.  IQR/med

## 2025-08-03 DIAGNOSIS — I67.1 CEREBRAL ANEURYSM: ICD-10-CM

## 2025-08-04 RX ORDER — ASPIRIN 81 MG/1
TABLET, COATED ORAL
Qty: 90 TABLET | Refills: 1 | Status: SHIPPED | OUTPATIENT
Start: 2025-08-04